# Patient Record
Sex: MALE | Race: WHITE | NOT HISPANIC OR LATINO | Employment: FULL TIME | URBAN - METROPOLITAN AREA
[De-identification: names, ages, dates, MRNs, and addresses within clinical notes are randomized per-mention and may not be internally consistent; named-entity substitution may affect disease eponyms.]

---

## 2017-04-13 ENCOUNTER — TELEPHONE (OUTPATIENT)
Dept: FAMILY MEDICINE | Facility: CLINIC | Age: 52
End: 2017-04-13

## 2017-04-13 ENCOUNTER — OFFICE VISIT (OUTPATIENT)
Dept: FAMILY MEDICINE | Facility: CLINIC | Age: 52
End: 2017-04-13
Payer: COMMERCIAL

## 2017-04-13 VITALS
DIASTOLIC BLOOD PRESSURE: 70 MMHG | TEMPERATURE: 96.6 F | WEIGHT: 209 LBS | BODY MASS INDEX: 33.59 KG/M2 | OXYGEN SATURATION: 96 % | HEART RATE: 84 BPM | SYSTOLIC BLOOD PRESSURE: 120 MMHG | RESPIRATION RATE: 22 BRPM | HEIGHT: 66 IN

## 2017-04-13 DIAGNOSIS — Z79.4 TYPE 2 DIABETES MELLITUS WITH DIABETIC POLYNEUROPATHY, WITH LONG-TERM CURRENT USE OF INSULIN (H): ICD-10-CM

## 2017-04-13 DIAGNOSIS — E11.42 TYPE 2 DIABETES MELLITUS WITH DIABETIC POLYNEUROPATHY, WITH LONG-TERM CURRENT USE OF INSULIN (H): ICD-10-CM

## 2017-04-13 PROCEDURE — 99214 OFFICE O/P EST MOD 30 MIN: CPT | Performed by: FAMILY MEDICINE

## 2017-04-13 PROCEDURE — 99207 C FOOT EXAM  NO CHARGE: CPT | Mod: 25 | Performed by: FAMILY MEDICINE

## 2017-04-13 NOTE — NURSING NOTE
"Chief Complaint   Patient presents with     Diabetes     Forms       Initial /70  Pulse 84  Temp 96.6  F (35.9  C) (Temporal)  Resp 22  Ht 5' 6.1\" (1.679 m)  Wt 209 lb (94.8 kg)  SpO2 96%  BMI 33.63 kg/m2 Estimated body mass index is 33.63 kg/(m^2) as calculated from the following:    Height as of this encounter: 5' 6.1\" (1.679 m).    Weight as of this encounter: 209 lb (94.8 kg).  Medication Reconciliation: complete    "

## 2017-04-13 NOTE — MR AVS SNAPSHOT
"              After Visit Summary   2017    Ralph Hagen    MRN: [id number]           Patient Information     Date Of Birth          1965        Visit Information        Provider Department      2017 4:20 PM Jose Cruz Isaac MD [facility]        Today's Diagnoses     Type 2 diabetes mellitus with diabetic polyneuropathy, with long-term current use of insulin (H)        Type 2 diabetes mellitus with diabetic polyneuropathy, unspecified long term insulin use status (H)           Follow-ups after your visit        Who to contact     If you have questions or need follow up information about today's clinic visit or your schedule please contact [facility] directly at [phone].  Normal or non-critical lab and imaging results will be communicated to you by MyChart, letter or phone within 4 business days after the clinic has received the results. If you do not hear from us within 7 days, please contact the clinic through [employer]ma[employer] or phone. If you have a critical or abnormal lab result, we will notify you by phone as soon as possible.  Submit refill requests through [employer] or call your pharmacy and they will forward the refill request to us. Please allow 3 business days for your refill to be completed.          Additional Information About Your Visit        MyChart Information     [employer] lets you send messages to your doctor, view your test results, renew your prescriptions, schedule appointments and more. To sign up, go to [email or web address] . Click on \"Log in\" on the left side of the screen, which will take you to the Welcome page. Then click on \"Sign up Now\" on the right side of the page.     You will be asked to enter the access code listed below, as well as some personal information. Please follow the directions to create your username and password.     Your access code is: [email or web address]  Expires: 2017  4:43 PM     Your access code will  in 90 " "days. If you need help or a new code, please call your [place] clinic or [phone].        Care EveryWhere ID     This is your Care EveryWhere ID. This could be used by other organizations to access your [place] medical records  [id number]        Your Vitals Were     Pulse Temperature Respirations Height Pulse Oximetry BMI (Body Mass Index)    84 96.6  F (35.9  C) (Temporal) 22 5' 6.1\" (1.679 m) 96% 33.63 kg/m2       Blood Pressure from Last 3 Encounters:   04/13/17 120/70   12/09/16 134/82   03/24/16 132/86    Weight from Last 3 Encounters:   04/13/17 209 lb (94.8 kg)   12/09/16 214 lb (97.1 kg)   04/20/16 222 lb 9.6 oz (101 kg)              Today, you had the following     No orders found for display       Primary Care Provider Office Phone # Fax #    Jose Cruz Isaac [phone]       [facility] [address] [place] [address]        Thank you!     Thank you for choosing [facility]  for your care. Our goal is always to provide you with excellent care. Hearing back from our patients is one way we can continue to improve our services. Please take a few minutes to complete the written survey that you may receive in the mail after your visit with us. Thank you!             Your Updated Medication List - Protect others around you: Learn how to safely use, store and throw away your medicines at www.disposemymeds.org.          This list is accurate as of: 4/13/17  4:43 PM.  Always use your most recent med list.                   Brand Name Dispense Instructions for use    ACE INHIBITOR CONTRAINDICATED AND/OR NOT INDICATED     0 each        blood glucose monitoring lancets     1 Box    Use to test blood sugar four to six times daily or as directed.       blood glucose monitoring test strip    ACCU-CHEK SMARTVIEW    100 strip    Use to test blood sugar four to six times daily or as directed.       glucagon 1 MG kit    GLUCAGON EMERGENCY    1 mg    Inject 1 " mg into the muscle once for 1 dose       insulin glargine U-300 300 UNIT/ML injection    TOUJEO SOLOSTAR    4.5 mL    Inject 50 Units Subcutaneous At Bedtime       insulin lispro 100 UNIT/ML injection    HumaLOG KWIKpen    15 mL    INJECT 4 UNITS PER CARB UNDER THE SKIN THREE TIMES A DAY BEFORE MEALS AS DIRECTED --MAX OF 45 UNITS/DAY       insulin pen needle 32G X 4 MM    BD FARHAD U/F    100 each    Use 4 needles daily or as directed.       vitamin D 2000 UNITS tablet     100 tablet    Take 2,000 Units by mouth daily Taking 2 a day

## 2017-04-13 NOTE — TELEPHONE ENCOUNTER
Bob Pham. Here is Ralph's information for getting samples from the manufacture for his Humalog and trujeo. KB/CMA

## 2017-04-13 NOTE — PROGRESS NOTES
SUBJECTIVE:                                                    Ralph Hagen is a 51 year old male who presents to clinic today for the following health issues:      Diabetes Follow-up    Patient is checking blood sugars: once daily.  Results are as follows:        Always High 250-275    Diabetic concerns:  blood sugar frequently over 200     Symptoms of hypoglycemia (low blood sugar): shaky, sweaty     Paresthesias (numbness or burning in feet) or sores: Yes patient reports that he had neuropathy for a few years     Date of last diabetic eye exam: Over a year ago       Amount of exercise or physical activity: 4-5 days/week for an average of 30-45 minutes    Problems taking medications regularly: No    Medication side effects: none    Diet: regular (no restrictions)          Problem list and histories reviewed & adjusted, as indicated.  Additional history: as documented        Reviewed and updated as needed this visit by clinical staff  Tobacco  Allergies  Meds       Reviewed and updated as needed this visit by Provider        SUBJECTIVE:  Ralph  is a 52 year old male who presents for:  TYPE 2 diabetes. He has not been in very good control. Cannot afford his medications. He tries to take insulin that he is picked up on the Internet. He has diabetic neuropathy. He cannot afford Lyrica. He is motivated to get his diabetes in better control but his insurance will not cover most of his medications. He has not had any incidence.    Past Medical History:   Diagnosis Date     Diabetes (H) 2007 or 2008     RAMÍREZ (obstructive sleep apnea) 2008    AHI 22.3 cpap     Past Surgical History:   Procedure Laterality Date     TONSILLECTOMY  1998     Social History   Substance Use Topics     Smoking status: Former Smoker     Packs/day: 1.00     Years: 20.00     Types: Cigarettes     Quit date: 3/1/2013     Smokeless tobacco: Never Used      Comment: current E Cig      Alcohol use 0.0 oz/week     0 Standard drinks or equivalent  "per week      Comment: very occ     Current Outpatient Prescriptions   Medication Sig Dispense Refill     insulin glargine U-300 (TOUJEO SOLOSTAR) 300 UNIT/ML injection Inject 50 Units Subcutaneous At Bedtime 4.5 mL 2     insulin lispro (HUMALOG KWIKPEN) 100 UNIT/ML injection INJECT 4 UNITS PER CARB UNDER THE SKIN THREE TIMES A DAY BEFORE MEALS AS DIRECTED --MAX OF 45 UNITS/DAY 15 mL 3     insulin pen needle (BD FARHAD U/F) 32G X 4 MM Use 4 needles daily or as directed. 100 each prn     Cholecalciferol (VITAMIN D) 2000 UNITS tablet Take 2,000 Units by mouth daily Taking 2 a day 100 tablet 3     glucagon (GLUCAGON EMERGENCY) 1 MG injection Inject 1 mg into the muscle once for 1 dose 1 mg 1     ACE INHIBITOR CONTRAINDICATED AND/OR NOT INDICATED  0 each 0     blood glucose (ACCU-CHEK SMARTVIEW) test strip Use to test blood sugar four to six times daily or as directed. 100 strip prn     blood glucose monitoring (ACCU-CHEK FASTCLIX) lancets Use to test blood sugar four to six times daily or as directed. 1 Box prn       REVIEW OF SYSTEMS:   5 point ROS negative except as noted above in HPI, including Gen., Resp, CV, GI &  system review.     OBJECTIVE:  Vitals: /70  Pulse 84  Temp 96.6  F (35.9  C) (Temporal)  Resp 22  Ht 5' 6.1\" (1.679 m)  Wt 209 lb (94.8 kg)  SpO2 96%  BMI 33.63 kg/m2  BMI= Body mass index is 33.63 kg/(m^2).  He appears well and in no distress. Eyes PERRLA. Throat clear. Neck supple. Lungs are clear to auscultation. Heart regular rhythm no murmur. Skin is clear. Extremities show no edema. Foot exam shows diminished sensation in the bottom of the feet but color no sores pulses are there. Hemoglobin A1c from 3 months ago was 11.7.    ASSESSMENT:  #1 type 2 diabetes with peripheral neuropathy and poor control.    PLAN:  Needs to get tighter control. We are trying to make arrangements for him to get insulin at more of a discount. Filled out forms for driving for him I feel he is safe driving. " Needs to closely watch his diet. We will see him back in 3 months time.        Jose Cruz Isaac MD  [facility]

## 2017-04-27 ENCOUNTER — TELEPHONE (OUTPATIENT)
Dept: EDUCATION SERVICES | Facility: CLINIC | Age: 52
End: 2017-04-27

## 2017-04-27 NOTE — TELEPHONE ENCOUNTER
Called and left message with patient to call me back regarding his insulin supply.     Vero Greenberg RDN, LD, CDE

## 2017-08-10 ENCOUNTER — TELEPHONE (OUTPATIENT)
Dept: FAMILY MEDICINE | Facility: CLINIC | Age: 52
End: 2017-08-10

## 2017-08-10 NOTE — TELEPHONE ENCOUNTER
Panel Management Review      Patient has the following on his problem list:     Diabetes    ASA: not on med list    Last A1C  Lab Results   Component Value Date    A1C 11.7 12/09/2016    A1C 10.5 03/24/2016    A1C 7.9 05/01/2015    A1C 8.1 01/09/2015    A1C 13.4 08/29/2014     A1C tested: FAILED    Last LDL:    Lab Results   Component Value Date    CHOL 222 12/09/2016     Lab Results   Component Value Date    HDL 45 12/09/2016     Lab Results   Component Value Date    LDL  12/09/2016     Cannot estimate LDL when triglyceride exceeds 400 mg/dL     12/09/2016     Lab Results   Component Value Date    TRIG 502 12/09/2016     Lab Results   Component Value Date    CHOLHDLRATIO 6.0 01/02/2014     Lab Results   Component Value Date    NHDL 177 12/09/2016       Is the patient on a Statin? NO             Is the patient on Aspirin? NO        Last three blood pressure readings:  BP Readings from Last 3 Encounters:   04/13/17 120/70   12/09/16 134/82   03/24/16 132/86       Date of last diabetes office visit: 4/13/17     Tobacco History:     History   Smoking Status     Former Smoker     Packs/day: 1.00     Years: 20.00     Types: Cigarettes     Quit date: 3/1/2013   Smokeless Tobacco     Never Used     Comment: current E Cig              Composite cancer screening  Chart review shows that this patient is due/due soon for the following Colonoscopy  Summary:    Patient is due/failing the following:   Microalbumin, TSH and A1C    Action needed:   Patient needs office visit for diabetes follow up. and Patient needs non-fasting lab only appointment    Type of outreach:    Phone, left message for patient to call back.     Questions for provider review:    None                                                                                                                                    Nicky Monet CMA       Chart routed to Care Team .

## 2017-08-10 NOTE — LETTER
[address] [address]  [phone]        Ralph Hagen  [address] [address] [place] [id number]      August 28, 2017      Dear Ralph,    I care about your health and have reviewed your health plan, including your medical conditions, medication list, and lab results and am making recommendations based on this review, to better manage your health.    You are in particular need of attention regarding:  -Diabetes    I am recommending that you:  -schedule a LAB ONLY APPOINTMENT to recheck your: A1c, Microablumin and TSH (thyroid test) tests within the next 1-4 weeks.  -schedule a FOLLOWUP OFFICE APPOINTMENT with Dr. Isaac.        If you've had the preventative screening completed at another facility or feel you're not due for this screening, please call our clinic at the number listed above or send us a My Chart message so we can update our records. We would like to thank you in advance for taking the time to take care of your health.  If you have any questions, please don t hesitate to contact our clinic.    Sincerely,       Your [facility]

## 2017-11-02 ENCOUNTER — TELEPHONE (OUTPATIENT)
Dept: FAMILY MEDICINE | Facility: CLINIC | Age: 52
End: 2017-11-02

## 2017-11-02 NOTE — LETTER
[address]   [address]  Tel. [phone] / Fax [phone]    November 2, 2017        Ralph Hagen  [address] [address] [place] [address]          Dear Ralph,    Our records show that you are due for a diabetic recheck. Please call [phone] to schedule an appointment for the next available opening. Thank you and we hope to see you soon.    Sincerely,        Jose Cruz Isaac M.D.

## 2017-11-02 NOTE — TELEPHONE ENCOUNTER
Panel Management Review      Patient has the following on his problem list:     Diabetes    ASA: Failed    Last A1C  Lab Results   Component Value Date    A1C 11.7 12/09/2016    A1C 10.5 03/24/2016    A1C 7.9 05/01/2015    A1C 8.1 01/09/2015    A1C 13.4 08/29/2014     A1C tested: FAILED    Last LDL:    Lab Results   Component Value Date    CHOL 222 12/09/2016     Lab Results   Component Value Date    HDL 45 12/09/2016     Lab Results   Component Value Date    LDL  12/09/2016     Cannot estimate LDL when triglyceride exceeds 400 mg/dL     12/09/2016     Lab Results   Component Value Date    TRIG 502 12/09/2016     Lab Results   Component Value Date    CHOLHDLRATIO 6.0 01/02/2014     Lab Results   Component Value Date    NHDL 177 12/09/2016       Is the patient on a Statin? NO             Is the patient on Aspirin? NO        Last three blood pressure readings:  BP Readings from Last 3 Encounters:   04/13/17 120/70   12/09/16 134/82   03/24/16 132/86       Date of last diabetes office visit: 04/13/2017     Tobacco History:     History   Smoking Status     Former Smoker     Packs/day: 1.00     Years: 20.00     Types: Cigarettes     Quit date: 3/1/2013   Smokeless Tobacco     Never Used     Comment: current E Cig              Composite cancer screening  Chart review shows that this patient is due/due soon for the following Diabetic check   Summary:    Patient is due/failing the following:   A1C and ASA    Action needed:   Patient needs office visit for Diabetic check.    Type of outreach:    Sent letter.    Questions for provider review:    None                                                                                                                                    ERS     Chart routed to NONE .

## 2018-02-19 ENCOUNTER — HOSPITAL ENCOUNTER (EMERGENCY)
Facility: CLINIC | Age: 53
Discharge: HOME | End: 2018-02-19
Attending: STUDENT IN AN ORGANIZED HEALTH CARE EDUCATION/TRAINING PROGRAM | Admitting: STUDENT IN AN ORGANIZED HEALTH CARE EDUCATION/TRAINING PROGRAM
Payer: COMMERCIAL

## 2018-02-19 VITALS
HEIGHT: 68 IN | OXYGEN SATURATION: 99 % | WEIGHT: 200 LBS | DIASTOLIC BLOOD PRESSURE: 87 MMHG | SYSTOLIC BLOOD PRESSURE: 141 MMHG | RESPIRATION RATE: 16 BRPM | TEMPERATURE: 98.1 F | BODY MASS INDEX: 30.31 KG/M2

## 2018-02-19 DIAGNOSIS — S13.4XXA WHIPLASH INJURY TO NECK, INITIAL ENCOUNTER: ICD-10-CM

## 2018-02-19 DIAGNOSIS — M54.6 ACUTE RIGHT-SIDED THORACIC BACK PAIN: ICD-10-CM

## 2018-02-19 DIAGNOSIS — M25.511 ACUTE PAIN OF RIGHT SHOULDER: ICD-10-CM

## 2018-02-19 PROCEDURE — 99284 EMERGENCY DEPT VISIT MOD MDM: CPT | Mod: Z6 | Performed by: STUDENT IN AN ORGANIZED HEALTH CARE EDUCATION/TRAINING PROGRAM

## 2018-02-19 PROCEDURE — 99282 EMERGENCY DEPT VISIT SF MDM: CPT | Performed by: STUDENT IN AN ORGANIZED HEALTH CARE EDUCATION/TRAINING PROGRAM

## 2018-02-19 RX ORDER — CYCLOBENZAPRINE HCL 10 MG
10 TABLET ORAL 3 TIMES DAILY PRN
Qty: 15 TABLET | Refills: 0 | Status: SHIPPED | OUTPATIENT
Start: 2018-02-19 | End: 2018-02-24
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
## 2018-02-19 NOTE — ED AVS SNAPSHOT
[facility]    [address] [facility] [address]    Phone:  [phone]    Fax:  [phone]                                       Ralph Hagen   MRN: [id number]    Department:  [facility]   Date of Visit:  2/19/2018           After Visit Summary Signature Page     I have received my discharge instructions, and my questions have been answered. I have discussed any challenges I see with this plan with the nurse or doctor.    ..........................................................................................................................................  Patient/Patient Representative Signature      ..........................................................................................................................................  Patient Representative Print Name and Relationship to Patient    ..................................................               ................................................  Date                                            Time    ..........................................................................................................................................  Reviewed by Signature/Title    ...................................................              ..............................................  Date                                                            Time
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
## 2018-03-30 ENCOUNTER — OFFICE VISIT (OUTPATIENT)
Dept: FAMILY MEDICINE | Facility: CLINIC | Age: 53
End: 2018-03-30
Payer: COMMERCIAL

## 2018-03-30 VITALS
BODY MASS INDEX: 31.84 KG/M2 | HEART RATE: 99 BPM | DIASTOLIC BLOOD PRESSURE: 70 MMHG | OXYGEN SATURATION: 99 % | TEMPERATURE: 98 F | SYSTOLIC BLOOD PRESSURE: 110 MMHG | WEIGHT: 210.1 LBS | HEIGHT: 68 IN

## 2018-03-30 DIAGNOSIS — E11.9 TYPE 2 DIABETES MELLITUS WITHOUT COMPLICATION, WITH LONG-TERM CURRENT USE OF INSULIN (H): Primary | ICD-10-CM

## 2018-03-30 DIAGNOSIS — Z79.4 TYPE 2 DIABETES MELLITUS WITHOUT COMPLICATION, WITH LONG-TERM CURRENT USE OF INSULIN (H): Primary | ICD-10-CM

## 2018-03-30 DIAGNOSIS — E55.9 VITAMIN D DEFICIENCY: ICD-10-CM

## 2018-03-30 DIAGNOSIS — E29.1 HYPOGONADISM MALE: ICD-10-CM

## 2018-03-30 LAB
CREAT SERPL-MCNC: 0.56 MG/DL (ref 0.66–1.25)
GFR SERPL CREATININE-BSD FRML MDRD: >90 ML/MIN/1.7M2

## 2018-03-30 PROCEDURE — 99214 OFFICE O/P EST MOD 30 MIN: CPT | Performed by: FAMILY MEDICINE

## 2018-03-30 PROCEDURE — 84403 ASSAY OF TOTAL TESTOSTERONE: CPT | Performed by: FAMILY MEDICINE

## 2018-03-30 PROCEDURE — 82306 VITAMIN D 25 HYDROXY: CPT | Performed by: FAMILY MEDICINE

## 2018-03-30 PROCEDURE — 36415 COLL VENOUS BLD VENIPUNCTURE: CPT | Performed by: FAMILY MEDICINE

## 2018-03-30 PROCEDURE — 82565 ASSAY OF CREATININE: CPT | Performed by: FAMILY MEDICINE

## 2018-03-30 PROCEDURE — 84270 ASSAY OF SEX HORMONE GLOBUL: CPT | Performed by: FAMILY MEDICINE

## 2018-03-30 NOTE — MR AVS SNAPSHOT
After Visit Summary   3/30/2018    Ralph Hagen    MRN: [id number]           Patient Information     Date Of Birth          1965        Visit Information        Provider Department      3/30/2018 9:40 AM Jose Cruz Isaac MD [facility]        Today's Diagnoses     Type 2 diabetes mellitus without complication, with long-term current use of insulin (H)    -  1    Hypogonadism male        Vitamin D deficiency           Follow-ups after your visit        Additional Services     MED THERAPY MANAGE REFERRAL       Your provider has referred you to: **[facility] Scheduling (numerous locations) [phone]   [email or web address]  [facility]: [facility] [phone]   [email or web address]    Reason for Referral: diabetes    The [facility] department will contact you to schedule an appointment.  You may also schedule the appointment by calling [phone].  For [facility] [place] [facility] [place] patients, please call [phone] to confirm/schedule your appointment on the next business day.    This service is designed to help you get the most from your medications.  A specially trained Pharmacist will work closely with you and your providers to solve any questions, concerns, issues or problems related to your medications.    Please bring all of your prescription and non-prescription medications (such as vitamins, over-the-counter medications, and herbals) or a detailed medication list to your appointment.    If you have a glucose meter or other home monitoring information, please also bring this to your appointment (i.e. blood glucose log, blood pressure log, pain log, etc.).                  Your next 10 appointments already scheduled     Apr 04, 2018  3:00 PM CDT   Office Visit with Bandar Colón, [facility]  "Clinic [facility] ([facility])    [address] [id number] [phone]           Bring a current list of meds and any records pertaining to this visit. For Physicals, please bring immunization records and any forms needing to be filled out. Please arrive 10 minutes early to complete paperwork.              Future tests that were ordered for you today     Open Future Orders        Priority Expected Expires Ordered    **TSH with free T4 reflex FUTURE 2mo Routine 5/29/2018 7/28/2018 3/30/2018    **A1C FUTURE 3mo Routine 6/28/2018 7/28/2018 3/30/2018            Who to contact     If you have questions or need follow up information about today's clinic visit or your schedule please contact [facility] directly at [phone].  Normal or non-critical lab and imaging results will be communicated to you by [employer], letter or phone within 4 business days after the clinic has received the results. If you do not hear from us within 7 days, please contact the clinic through [employer] or phone. If you have a critical or abnormal lab result, we will notify you by phone as soon as possible.  Submit refill requests through [employer] or call your pharmacy and they will forward the refill request to us. Please allow 3 business days for your refill to be completed.          Additional Information About Your Visit        [employer] Information     [employer] lets you send messages to your doctor, view your test results, renew your prescriptions, schedule appointments and more. To sign up, go to [email or web address] . Click on \"Log in\" on the left side of the screen, which will take you to the Welcome page. Then click on \"Sign up Now\" on the right side of the page.     You will be asked to enter the access code listed below, as well as some personal information. Please follow the directions to create your username and password.     Your access code is: [email or web address]  Expires: 5/20/2018 11:01 AM   " "  Your access code will  in 90 days. If you need help or a new code, please call your [facility] clinic or [phone].        Care EveryWhere ID     This is your Care EveryWhere ID. This could be used by other organizations to access your [facility] medical records  [id number]        Your Vitals Were     Pulse Temperature Height Pulse Oximetry BMI (Body Mass Index)       99 98  F (36.7  C) (Temporal) 5' 8\" (1.727 m) 99% 31.95 kg/m2        Blood Pressure from Last 3 Encounters:   18 110/70   18 141/87   17 120/70    Weight from Last 3 Encounters:   18 210 lb 1.6 oz (95.3 kg)   18 200 lb (90.7 kg)   17 209 lb (94.8 kg)              We Performed the Following     Creatinine     MED THERAPY MANAGE REFERRAL     TESTOSTERONE, FREE & TOTAL     Vitamin D Deficiency        Primary Care Provider Office Phone # Fax #    Jose Cruz Isaac [phone]       [address] [address]        Equal Access to Services     [facility] : Hadii kadi ku hadasho Soomaali, waaxda luqadaha, qaybta kaalmada adenegroyaaddison, sarika pinto . So [facility] [phone].    ATENCIÓN: Si habla español, tiene a haynes disposición servicios gratuitos de asistencia lingüística. [facility] [phone].    We comply with applicable federal civil rights laws and [place] laws. We do not discriminate on the basis of race, color, national origin, age, disability, sex, sexual orientation, or gender identity.            Thank you!     Thank you for choosing [facility]  for your care. Our goal is always to provide you with excellent care. Hearing back from our patients is one way we can continue to improve our services. Please take a few minutes to complete the written survey that you may receive in the mail after your visit with us. Thank you!             Your Updated Medication List - Protect others around you: Learn how to safely use, store and throw " away your medicines at www.disposemymeds.org.          This list is accurate as of 3/30/18 10:11 AM.  Always use your most recent med list.                   Brand Name Dispense Instructions for use Diagnosis    ACE INHIBITOR CONTRAINDICATED AND/OR NOT INDICATED     0 each         blood glucose monitoring lancets     1 Box    Use to test blood sugar four to six times daily or as directed.    Type I (juvenile type) diabetes mellitus without mention of complication, uncontrolled       blood glucose monitoring test strip    ACCU-CHEK SMARTVIEW    100 strip    Use to test blood sugar four to six times daily or as directed.    Type I (juvenile type) diabetes mellitus without mention of complication, uncontrolled       glucagon 1 MG kit    GLUCAGON EMERGENCY    1 mg    Inject 1 mg into the muscle once for 1 dose    Type 1 diabetes, HbA1c goal < 7% (H)       insulin glargine U-300 300 UNIT/ML injection    TOUJEO SOLOSTAR    4.5 mL    Inject 50 Units Subcutaneous At Bedtime    Type 2 diabetes mellitus with diabetic polyneuropathy, with long-term current use of insulin (H)       insulin lispro 100 UNIT/ML injection    HumaLOG KWIKpen    15 mL    INJECT 4 UNITS PER CARB UNDER THE SKIN THREE TIMES A DAY BEFORE MEALS AS DIRECTED --MAX OF 45 UNITS/DAY    Type 2 diabetes mellitus with diabetic polyneuropathy, with long-term current use of insulin (H)       insulin pen needle 32G X 4 MM    BD FARHAD U/F    100 each    Use 4 needles daily or as directed.    Type 2 diabetes mellitus with diabetic polyneuropathy, with long-term current use of insulin (H)       vitamin D 2000 UNITS tablet     100 tablet    Take 2,000 Units by mouth daily Taking 2 a day    Unspecified vitamin D deficiency

## 2018-03-30 NOTE — NURSING NOTE
"Chief Complaint   Patient presents with     Diabetes     follow up        Initial /70 (BP Location: Right arm, Patient Position: Chair, Cuff Size: Adult Large)  Pulse 99  Temp 98  F (36.7  C) (Temporal)  Ht 5' 8\" (1.727 m)  Wt 210 lb 1.6 oz (95.3 kg)  SpO2 99%  BMI 31.95 kg/m2 Estimated body mass index is 31.95 kg/(m^2) as calculated from the following:    Height as of this encounter: 5' 8\" (1.727 m).    Weight as of this encounter: 210 lb 1.6 oz (95.3 kg).  Medication Reconciliation: complete     "

## 2018-03-30 NOTE — LETTER
[address]   [address]  Tel. [phone]/ Fax [phone]    February 8, 2019        Ralph Hagen  [address] [address] [place] [address]          Dear Mr. Ralph Hagen:    Your previous orders for lab work have been extended.  Please call to schedule a lab appointment and return to the clinic to have the labs drawn at your earliest convenience.    If you are required to be fasting for these tests,  remember to have nothing by mouth (except water) after midnight on the night before your test.    If you have any questions or concerns, please contact our office.    Sincerely,       Jose Cruz Isaac M.D. Care team

## 2018-03-30 NOTE — PROGRESS NOTES
SUBJECTIVE:   Ralph Hagen is a 52 year old male who presents to clinic today for the following health issues:    Patient has no questions or concerns at this time. Patient is not fasting today.       Diabetes Follow-up      Patient is checking blood sugars: not at all    Diabetic concerns: None     Symptoms of hypoglycemia (low blood sugar): none     Paresthesias (numbness or burning in feet) or sores: Yes neuropathy has improved a bit.      Date of last diabetic eye exam: 2 yrs ago maybe     BP Readings from Last 2 Encounters:   02/19/18 141/87   04/13/17 120/70     Hemoglobin A1C (%)   Date Value   12/09/2016 11.7 (H)   03/24/2016 10.5 (H)     LDL Cholesterol Calculated (mg/dL)   Date Value   12/09/2016     Cannot estimate LDL when triglyceride exceeds 400 mg/dL   03/24/2016     Cannot estimate LDL when triglyceride exceeds 400 mg/dL     LDL Cholesterol Direct (mg/dL)   Date Value   12/09/2016 124 (H)       Amount of exercise or physical activity: 4-5 days/week for an average of 15-30 minutes    Problems taking medications regularly: No    Medication side effects: none    Diet: regular (no restrictions)            Problem list and histories reviewed & adjusted, as indicated.  Additional history: as documented        Reviewed and updated as needed this visit by clinical staff       Reviewed and updated as needed this visit by Provider        SUBJECTIVE:  Ralph  is a 52 year old male who presents for: Follow-up of his diabetes.  He has not been seen in a long time.  He is not checking his blood sugars at all.  Last hemoglobin A1c was very high.  11.7 in December 2016.  He is not fasting today.  Also he has noticed decreased libido.  He wants testosterone levels checked.  He is overdue for many other labs but because is not fasting we will have to wait.  He is on insulin.    Past Medical History:   Diagnosis Date     Diabetes (H) 2007 or 2008     RAMÍREZ (obstructive sleep apnea) 2008    AHI 22.3 cpap     Past  "Surgical History:   Procedure Laterality Date     TONSILLECTOMY  1998     Social History   Substance Use Topics     Smoking status: Former Smoker     Packs/day: 1.00     Years: 20.00     Types: Cigarettes     Quit date: 3/1/2013     Smokeless tobacco: Never Used      Comment: current E Cig      Alcohol use 0.0 oz/week     0 Standard drinks or equivalent per week      Comment: very occ     Current Outpatient Prescriptions   Medication Sig Dispense Refill     insulin glargine U-300 (TOUJEO SOLOSTAR) 300 UNIT/ML injection Inject 50 Units Subcutaneous At Bedtime 4.5 mL 2     insulin lispro (HUMALOG KWIKPEN) 100 UNIT/ML injection INJECT 4 UNITS PER CARB UNDER THE SKIN THREE TIMES A DAY BEFORE MEALS AS DIRECTED --MAX OF 45 UNITS/DAY 15 mL 3     insulin pen needle (BD FARHAD U/F) 32G X 4 MM Use 4 needles daily or as directed. 100 each prn     blood glucose (ACCU-CHEK SMARTVIEW) test strip Use to test blood sugar four to six times daily or as directed. 100 strip prn     blood glucose monitoring (ACCU-CHEK FASTCLIX) lancets Use to test blood sugar four to six times daily or as directed. 1 Box prn     Cholecalciferol (VITAMIN D) 2000 UNITS tablet Take 2,000 Units by mouth daily Taking 2 a day 100 tablet 3     glucagon (GLUCAGON EMERGENCY) 1 MG injection Inject 1 mg into the muscle once for 1 dose 1 mg 1     ACE INHIBITOR CONTRAINDICATED AND/OR NOT INDICATED  0 each 0       REVIEW OF SYSTEMS:   5 point ROS negative except as noted above in HPI, including Gen., Resp, CV, GI &  system review.     OBJECTIVE:  Vitals: /70 (BP Location: Right arm, Patient Position: Chair, Cuff Size: Adult Large)  Pulse 99  Temp 98  F (36.7  C) (Temporal)  Ht 5' 8\" (1.727 m)  Wt 210 lb 1.6 oz (95.3 kg)  SpO2 99%  BMI 31.95 kg/m2  BMI= Body mass index is 31.95 kg/(m^2).  He appears well and in no distress.  Eyes PERRLA.  Throat clear.  Neck supple.  Lungs are clear.  Heart regular rhythm no murmur.  Skin clear.  Extremities with no edema. "  Foot exam shows good pulses good sensation.    ASSESSMENT:  #1 diabetes #2 decreased libido #3 history of vitamin D deficiency    PLAN:  We will set him up with MTM.  We need to get a hemoglobin A1c on him.  Vitamin D TSH testosterone level.  Continue his medications he has been getting him from the Internet because of insulin costs.        Jose Cruz Isaac MD  [facility]

## 2018-04-02 LAB — DEPRECATED CALCIDIOL+CALCIFEROL SERPL-MC: 11 UG/L (ref 20–75)

## 2018-04-04 ENCOUNTER — OFFICE VISIT (OUTPATIENT)
Dept: PHARMACY | Facility: CLINIC | Age: 53
End: 2018-04-04
Payer: COMMERCIAL

## 2018-04-04 VITALS — DIASTOLIC BLOOD PRESSURE: 82 MMHG | SYSTOLIC BLOOD PRESSURE: 130 MMHG

## 2018-04-04 DIAGNOSIS — E55.9 VITAMIN D DEFICIENCY: ICD-10-CM

## 2018-04-04 DIAGNOSIS — E78.5 HYPERLIPIDEMIA LDL GOAL <100: ICD-10-CM

## 2018-04-04 DIAGNOSIS — E11.42 TYPE 2 DIABETES MELLITUS WITH DIABETIC POLYNEUROPATHY, WITH LONG-TERM CURRENT USE OF INSULIN (H): Primary | ICD-10-CM

## 2018-04-04 DIAGNOSIS — Z79.4 TYPE 2 DIABETES MELLITUS WITH DIABETIC POLYNEUROPATHY, WITH LONG-TERM CURRENT USE OF INSULIN (H): Primary | ICD-10-CM

## 2018-04-04 PROCEDURE — 99605 MTMS BY PHARM NP 15 MIN: CPT | Performed by: PHARMACIST

## 2018-04-04 PROCEDURE — 99607 MTMS BY PHARM ADDL 15 MIN: CPT | Performed by: PHARMACIST

## 2018-04-04 NOTE — PROGRESS NOTES
SUBJECTIVE/OBJECTIVE:                           Ralph Hagen is a 52 year old male coming in for an initial visit for Medication Therapy Management.  He was referred to me from Dr. Isaac.     Chief Complaint: Diabetes/Medication Costs.  Personal Healthcare Goals: get sugars under control, daughter is having first grandchild soon    Allergies/ADRs: Reviewed in Epic  Tobacco: History of tobacco dependence - quit 5 years ago - started E-cig recently  Alcohol: 4-6 beverages / week  Caffeine: a few cans of diet sodas/day  Activity: Runner  PMH: Reviewed in Epic    Medication Adherence/Access:  Patient takes medications directly from bottles.  Patient takes medications 4 time(s) per day.   Per patient, misses medication 0 times per week.   Medication barriers: obtaining medication from insurance and affording medications.   The patient fills medications at [facility]: YES.    Diabetes:  Pt currently taking Lantus/Toujeo 35-40 if Lantus (25-35 units with Toujeo) and Humalog/Novolog - 18-20 units (2-4 units per carb choice).  Pt had been buying his insulin off of [employer] due to high deductible plan. Pt states he purchases from a known individual who stores medication as directed.  He has purchased from other people in the past and found it did not work as well.  He has a large ~$6000 deductible, so has not been able to afford his own insulin.  Has previously used pens and vials/syringes.  Has large supply of insulin on hand at this time.  Lost insurance that covered pump supplies/sensors which he was doing very well on.  Came in for A1c the other day, but did not have this drawn for unknown reason (other labs were drawn).  Concerned about too much insulin as he fears going low overnight without someone there to help. Pt is not experiencing side effects.  SMBG: 3 times a week. He tries to ration test strips as these are also a higher cost for him.   Ranges (patient reported): 250-300 mg/dL  Patient is not experiencing  hypoglycemia  Recent symptoms of high blood sugar? polydipsia, polyphagia, fatigue and insomnia. Did have some concerns with decreased libido and this is why he was tested for low testosterone.   Eye exam: due  Foot exam: up to date  Microalbumin is < 30 mg/g. Pt is not taking an ACEi/ARB.  Aspirin: Not taking due to not being previously recommended  Diet/Exercise: Loves sweets - cookies/candies/banana.  Within last year he has lost 50 lbs.  Tries to eat three meals per day.  High stress job, recent divorce.  Tries to stay active - runner.    Vitamin D Deficiency: Currently taking no supplement. Vitamin D level from 3/30/18 was 11 ug/dL. Pt reports feeling tired, gets sick easily, low mood.     Hyperlipidemia: Current therapy includes no current medications. Had previously been on statin and tolerated. Has not been on for some time. Pt reports no significant myalgias or other side effects.   The 10-year ASCVD risk score (Red Housemarcie ALATORRE Jr, et al., 2013) is: 6.5%    Values used to calculate the score:      Age: 52 years      Sex: Male      Is Non- : No      Diabetic: Yes      Tobacco smoker: No      Systolic Blood Pressure: 110 mmHg      Is BP treated: No      HDL Cholesterol: 45 mg/dL      Total Cholesterol: 222 mg/dL    Current labs include:  Today's Vitals: /82  BP Readings from Last 3 Encounters:   03/30/18 110/70   02/19/18 141/87   04/13/17 120/70     Lab Results   Component Value Date    A1C 11.7 12/09/2016   .  Lab Results   Component Value Date    CHOL 222 12/09/2016     Lab Results   Component Value Date    TRIG 502 12/09/2016     Lab Results   Component Value Date    HDL 45 12/09/2016     Lab Results   Component Value Date    LDL  12/09/2016     Cannot estimate LDL when triglyceride exceeds 400 mg/dL     12/09/2016       Liver Function Studies -   Recent Labs   Lab Test  12/09/16   1005   PROTTOTAL  7.5   ALBUMIN  4.1   BILITOTAL  0.7   ALKPHOS  134   AST  15   ALT  68        Lab Results   Component Value Date    UCRR 160 05/01/2015    MICROL 7 05/01/2015    UMALCR 4.61 05/01/2015       Last Basic Metabolic Panel:  Lab Results   Component Value Date     12/09/2016      Lab Results   Component Value Date    POTASSIUM 4.0 12/09/2016     Lab Results   Component Value Date    CHLORIDE 102 12/09/2016     Lab Results   Component Value Date    BUN 16 12/09/2016     Lab Results   Component Value Date    CR 0.56 03/30/2018     GFR Estimate   Date Value Ref Range Status   03/30/2018 >90 >60 mL/min/1.7m2 Final     Comment:     Non  GFR Calc   12/09/2016 >90  Non  GFR Calc   >60 mL/min/1.7m2 Final   03/24/2016 >90  Non  GFR Calc   >60 mL/min/1.7m2 Final     TSH   Date Value Ref Range Status   08/29/2014 1.61 0.40 - 4.00 mU/L Final     Comment:     Effective 7/30/2014, the reference range for this assay has changed to reflect   new instrumentation/methodology.         There is no immunization history on file for this patient.    ASSESSMENT:                             Current medications were reviewed today.     Medication Adherence: fair, needs improvement - see below    Diabetes: Needs Improvement. Patient is not meeting A1c goal of < 7%. Self monitoring of blood glucose is not at goal of fasting  mg/dL and post prandial < 180 mg/dL.  Pt would benefit from obtaining his own supply through his pharmacy - need to determine costs of medication via co-pay assistance programs and what is covered by formulary.  Given that A1c was not drawn and patient has been limiting testing to ration costs, he would benefit from obtaining different testing system that would allow him to test as necessary to determine current control and improve dosing of mealtime insulin.  Also could consider metformin in the future to lower insulin needs/resistance.    Vitamin D Deficiency: Needs Improvement. Pt would benefit from restarting vitamin  D3.    Hyperlipidemia: Needs Improvement. Pt is not on moderate intensity statin which is indicated based on 2013 ACC/AHA guidelines for lipid management.  Given other priorities will defer starting statin until future visit.    PLAN:                            1. Start aspirin 81 mg daily.  2. Pt to start checking blood sugars three times daily.  Discussed Relion meters and Freestyle Kostas to save on testing costs.  3. Pt/MTM to determine which insulins are covered under current insurance formulary.  Also discussed option for Relion insulin products.  4. Future considerations - statin?    I spent 60 minutes with this patient today. All changes were made via collaborative practice agreement with Jose Cruz Isaac. A copy of the visit note was provided to the patient's primary care provider.    Will follow up in 2-4 weeks.    The patient was given a summary of these recommendations as an after visit summary.     Luis Miguel Colón, PharmD, [facility]  Medication Therapy Management Pharmacist  Pager: [phone]

## 2018-04-04 NOTE — PATIENT INSTRUCTIONS
Recommendations from today's MTM visit:                                                    MTM (medication therapy management) is a service provided by a clinical pharmacist designed to help you get the most of out of your medicines.   Today we reviewed what your medicines are for, how to know if they are working, that your medicines are safe and how to make your medicine regimen as easy as possible.     1. We will first have you start testing up to three times a day.  There are a few options available for you to save money on test strips.  The best option is the Relion Glucometer from [employer] and the test strips - around $10 for 50.  The FreeStyle Kostas continuous glucose monitor is another option that may be able to consider.  Take a look at the information we gave you and let us know if you would be interested in trying.     2. Regarding insulin we will look at sending in for what is covered under your insurance and seeing if Co-pay cards can bring down the price to a reasonable amount.  Another option in the future if needed is the Relion brand insulin from [employer].  These are older varieties of insulin, but are still effective.      3. Start vitamin D3 2000 units daily.     4. Start aspirin 81 mg daily.    5. We will talk about restarting a statin (a heart protection/cholesterol) in the future.     Next MTM visit: We will look at scheduling an appointment about a month from now, but will check in with Alejandra first.     To schedule another MTM appointment, please call the clinic directly or you may call the MTM scheduling line at [phone] or toll-free at [phone].     My Clinical Pharmacist's contact information:                                                      It was a pleasure seeing you today!  Please feel free to contact me with any questions or concerns you have.      Luis Miguel Colón, PharmD, BCACP  Medication Therapy Management Pharmacist  Pager: [phone]    You may receive a survey about the  MTM services you received.  I would appreciate your feedback to help me serve you better in the future. Please fill it out and return it when you can. Your comments will be anonymous.

## 2018-04-04 NOTE — MR AVS SNAPSHOT
After Visit Summary   4/4/2018    Ralph Hagen    MRN: [id number]           Patient Information     Date Of Birth          1965        Visit Information        Provider Department      4/4/2018 3:00 PM Bandar Colón, [facility] MTM        Care Instructions    Recommendations from today's MTM visit:                                                    MTM (medication therapy management) is a service provided by a clinical pharmacist designed to help you get the most of out of your medicines.   Today we reviewed what your medicines are for, how to know if they are working, that your medicines are safe and how to make your medicine regimen as easy as possible.     1. We will first have you start testing up to three times a day.  There are a few options available for you to save money on test strips.  The best option is the Relion Glucometer from [employer] and the test strips - around $10 for 50.  The FreeStyle Kostas continuous glucose monitor is another option that may be able to consider.  Take a look at the information we gave you and let us know if you would be interested in trying.     2. Regarding insulin we will look at sending in for what is covered under your insurance and seeing if Co-pay cards can bring down the price to a reasonable amount.  Another option in the future if needed is the Relion brand insulin from [employer].  These are older varieties of insulin, but are still effective.      3. Start vitamin D3 2000 units daily.     4. Start aspirin 81 mg daily.    5. We will talk about restarting a statin (a heart protection/cholesterol) in the future.     Next MTM visit: We will look at scheduling an appointment about a month from now, but will check in with Alejandra first.     To schedule another MTM appointment, please call the clinic directly or you may call the MTM scheduling line at [phone] or toll-free at [phone].     My Clinical Pharmacist's  contact information:                                                      It was a pleasure seeing you today!  Please feel free to contact me with any questions or concerns you have.      Luis Miguel Colón, PharmD, [facility]  Medication Therapy Management Pharmacist  Pager: [phone]    You may receive a survey about the [facility] services you received.  I would appreciate your feedback to help me serve you better in the future. Please fill it out and return it when you can. Your comments will be anonymous.                Follow-ups after your visit        Who to contact     If you have questions or need follow up information about today's clinic visit or your schedule please contact [facility] directly at [phone].  Normal or non-critical lab and imaging results will be communicated to you by MyChart, letter or phone within 4 business days after the clinic has received the results. If you do not hear from us within 7 days, please contact the clinic through MobileRQhart or phone. If you have a critical or abnormal lab result, we will notify you by phone as soon as possible.  Submit refill requests through Send the Trend or call your pharmacy and they will forward the refill request to us. Please allow 3 business days for your refill to be completed.          Additional Information About Your Visit        MyChart Information     Send the Trend gives you secure access to your electronic health record. If you see a primary care provider, you can also send messages to your care team and make appointments. If you have questions, please call your primary care clinic.  If you do not have a primary care provider, please call [phone] and they will assist you.        Care EveryWhere ID     This is your Care EveryWhere ID. This could be used by other organizations to access your [facility] medical records  [id number]         Blood Pressure from Last 3 Encounters:   03/30/18 110/70   02/19/18 141/87   04/13/17 120/70    Weight from  Last 3 Encounters:   03/30/18 210 lb 1.6 oz (95.3 kg)   02/19/18 200 lb (90.7 kg)   04/13/17 209 lb (94.8 kg)              Today, you had the following     No orders found for display       Primary Care Provider Office Phone # Fax #    Jose Cruz Isaac [phone]       [address] [facility] [address]        Equal Access to Services     FRANKI FOUNTAIN : Hadii aad ku hadasho Soomaali, waaxda luqadaha, qaybta kaalmada adeegyada, waxay idiin hayaan adeeg kharash la'betzyn . So [facility] [phone].    ATENCIÓN: Si habla español, tiene a haynes disposición servicios gratuitos de asistencia lingüística. Llame al [phone].    We comply with applicable federal civil rights laws and [place] laws. We do not discriminate on the basis of race, color, national origin, age, disability, sex, sexual orientation, or gender identity.            Thank you!     Thank you for choosing [facility]  for your care. Our goal is always to provide you with excellent care. Hearing back from our patients is one way we can continue to improve our services. Please take a few minutes to complete the written survey that you may receive in the mail after your visit with us. Thank you!             Your Updated Medication List - Protect others around you: Learn how to safely use, store and throw away your medicines at www.disposemymeds.org.          This list is accurate as of 4/4/18  3:53 PM.  Always use your most recent med list.                   Brand Name Dispense Instructions for use Diagnosis    ACE INHIBITOR CONTRAINDICATED AND/OR NOT INDICATED     0 each         blood glucose monitoring lancets     1 Box    Use to test blood sugar four to six times daily or as directed.    Type I (juvenile type) diabetes mellitus without mention of complication, uncontrolled       blood glucose monitoring test strip    ACCU-CHEK SMARTVIEW    100 strip    Use to test blood sugar four to six times daily or as  directed.    Type I (juvenile type) diabetes mellitus without mention of complication, uncontrolled       glucagon 1 MG kit    GLUCAGON EMERGENCY    1 mg    Inject 1 mg into the muscle once for 1 dose    Type 1 diabetes, HbA1c goal < 7% (H)       insulin glargine U-300 300 UNIT/ML injection    TOUJEO SOLOSTAR    4.5 mL    Inject 50 Units Subcutaneous At Bedtime    Type 2 diabetes mellitus with diabetic polyneuropathy, with long-term current use of insulin (H)       insulin lispro 100 UNIT/ML injection    HumaLOG KWIKpen    15 mL    INJECT 4 UNITS PER CARB UNDER THE SKIN THREE TIMES A DAY BEFORE MEALS AS DIRECTED --MAX OF 45 UNITS/DAY    Type 2 diabetes mellitus with diabetic polyneuropathy, with long-term current use of insulin (H)       insulin pen needle 32G X 4 MM    BD FARHAD U/F    100 each    Use 4 needles daily or as directed.    Type 2 diabetes mellitus with diabetic polyneuropathy, with long-term current use of insulin (H)       vitamin D 2000 UNITS tablet     100 tablet    Take 2,000 Units by mouth daily Taking 2 a day    Unspecified vitamin D deficiency

## 2018-04-05 LAB
SHBG SERPL-SCNC: 27 NMOL/L (ref 11–80)
TESTOST FREE SERPL-MCNC: 8.71 NG/DL (ref 4.7–24.4)
TESTOST SERPL-MCNC: 385 NG/DL (ref 240–950)

## 2018-04-10 NOTE — PROGRESS NOTES
Labs show testosterone level was normal.  His vitamin D level was low with all and if not should be on some vitamin D supplement again.

## 2018-04-26 ENCOUNTER — TELEPHONE (OUTPATIENT)
Dept: FAMILY MEDICINE | Facility: CLINIC | Age: 53
End: 2018-04-26

## 2018-04-26 NOTE — LETTER
[address] [id number] [phone]        Ralph Hagen  [address] [address] [place] [address]      April 26, 2018      Dear Ralph,    I care about your health and have reviewed your health plan, including your medical conditions, medication list, and lab results and am making recommendations based on this review, to better manage your health.    You are in particular need of attention regarding:  -Colon Cancer Screening    I am recommending that you:  -schedule a COLONOSCOPY.  Colon cancer is now the second leading cause of cancer-related deaths in the [place] for both men and women.  There are over 130,000 new cases and 50,000 deaths per year from colon cancer.  A recent study, which included patients ages 55 to 79 found 50,400 [place] deaths from colorectal cancer could have been prevented if patients had undergone a colonoscopy in the previous 10 years.    If you have not had a colonoscopy, we encourage you to schedule by contacting us at [phone], Monday through Friday.  After hours, you may leave a message and we will return your call during normal business hours.      There is another option called a FIT test, if you don t wish to have a colonoscopy, which needs to be repeated every year.  It does replace the colonoscopy for colorectal cancer screening and can detect hidden bleeding in the lower colon.  If a positive result is obtained, you would be referred for a colonoscopy. Please discuss this option with your provider.      For patients under/uninsured, we recommend you contact the Vends[employer] program. [employer] is a free colorectal cancer screening program that provides colonoscopies for eligible under/uninsured [place] men and women. If you are interested in receiving a free colonoscopy, please call This Week In at [phone] (mention code ScopesWeb) to see if you re eligible.     If you've had the preventative  screening completed at another facility or feel you're not due for this screening, please call our clinic at the number listed above or send us a My Chart message so we can update our records. We would like to thank you in advance for taking the time to take care of your health.  If you have any questions, please don t hesitate to contact our clinic.    Sincerely,       Your [facility] Team

## 2018-04-26 NOTE — TELEPHONE ENCOUNTER
Panel Management Review      Patient has the following on his problem list:     Diabetes    ASA: Passed    Last A1C  Lab Results   Component Value Date    A1C 11.7 12/09/2016    A1C 10.5 03/24/2016    A1C 7.9 05/01/2015    A1C 8.1 01/09/2015    A1C 13.4 08/29/2014     A1C tested: FAILED    Last LDL:    Lab Results   Component Value Date    CHOL 222 12/09/2016     Lab Results   Component Value Date    HDL 45 12/09/2016     Lab Results   Component Value Date    LDL  12/09/2016     Cannot estimate LDL when triglyceride exceeds 400 mg/dL     12/09/2016     Lab Results   Component Value Date    TRIG 502 12/09/2016     Lab Results   Component Value Date    CHOLHDLRATIO 6.0 01/02/2014     Lab Results   Component Value Date    NHDL 177 12/09/2016       Is the patient on a Statin? NO             Is the patient on Aspirin? YES    Medications     Salicylates    aspirin 81 MG tablet          Last three blood pressure readings:  BP Readings from Last 3 Encounters:   04/04/18 130/82   03/30/18 110/70   02/19/18 141/87       Date of last diabetes office visit:03/30/2018     Tobacco History:     History   Smoking Status     Former Smoker     Packs/day: 1.00     Years: 20.00     Types: Cigarettes     Quit date: 3/1/2013   Smokeless Tobacco     Never Used     Comment: current E Cig            Composite cancer screening  Chart review shows that this patient is due/due soon for the following Colonoscopy and Fecal Colorectal (FIT)  Summary:    Patient is due/failing the following:   A1C, COLONOSCOPY and FIT    Action needed:   Patient needs non-fasting lab only appointment and Patient needs referral/order: Colonoscopy/FIT    Type of outreach:    Spoke to patient about A1c needing to get done. Sent letter for colonoscopy.     Questions for provider review:    None                                                                                                                                    ers     Chart routed to none  .

## 2018-11-29 ENCOUNTER — TELEPHONE (OUTPATIENT)
Dept: FAMILY MEDICINE | Facility: CLINIC | Age: 53
End: 2018-11-29

## 2018-11-29 DIAGNOSIS — E11.9 TYPE 2 DIABETES MELLITUS WITHOUT COMPLICATION, WITH LONG-TERM CURRENT USE OF INSULIN (H): Primary | ICD-10-CM

## 2018-11-29 DIAGNOSIS — Z79.4 TYPE 2 DIABETES MELLITUS WITHOUT COMPLICATION, WITH LONG-TERM CURRENT USE OF INSULIN (H): Primary | ICD-10-CM

## 2018-11-29 NOTE — TELEPHONE ENCOUNTER
Panel Management Review      Patient has the following on his problem list:     Diabetes    ASA: Not Required     Last A1C  Lab Results   Component Value Date    A1C 11.7 12/09/2016    A1C 10.5 03/24/2016    A1C 7.9 05/01/2015    A1C 8.1 01/09/2015    A1C 13.4 08/29/2014     A1C tested: FAILED    Last LDL:    Lab Results   Component Value Date    CHOL 222 12/09/2016     Lab Results   Component Value Date    HDL 45 12/09/2016     Lab Results   Component Value Date    LDL  12/09/2016     Cannot estimate LDL when triglyceride exceeds 400 mg/dL     12/09/2016     Lab Results   Component Value Date    TRIG 502 12/09/2016     Lab Results   Component Value Date    CHOLHDLRATIO 6.0 01/02/2014     Lab Results   Component Value Date    NHDL 177 12/09/2016       Is the patient on a Statin? NO             Is the patient on Aspirin? YES    Medications     Salicylates    aspirin 81 MG tablet          Last three blood pressure readings:  BP Readings from Last 3 Encounters:   04/04/18 130/82   03/30/18 110/70   02/19/18 141/87       Date of last diabetes office visit: 3/30/18     Tobacco History:     History   Smoking Status     Former Smoker     Packs/day: 1.00     Years: 20.00     Types: Cigarettes     Quit date: 3/1/2013   Smokeless Tobacco     Never Used     Comment: current E Cig            Composite cancer screening  Chart review shows that this patient is due/due soon for the following Colonoscopy  Summary:    Patient is due/failing the following:   A1C, COLONOSCOPY and LDL    Action needed:   Patient needs office visit for labs and physical .    Type of outreach:    Phone, spoke to patient.  A1C order placed for future patient will call to do lab only appointment in a couple weeks    Questions for provider review:    None                                                                                                                                    Melissa Mckoy MA     Chart routed to Care Team .

## 2019-07-12 NOTE — PROGRESS NOTES
Subjective     Ralph Hagen is a 54 year old male who presents to clinic today for the following health issues:    HPI   Diabetes Follow-up      How often are you checking your blood sugar? A few times a week    What time of day are you checking your blood sugars (select all that apply)?  After meals    Have you had any blood sugars above 200?  Yes     Have you had any blood sugars below 70?  No    What symptoms do you notice when your blood sugar is low?  None    What concerns do you have today about your diabetes? None     Do you have any of these symptoms? (Select all that apply)  No numbness or tingling in feet.  No redness, sores or blisters on feet.  No complaints of excessive thirst.  No reports of blurry vision.  No significant changes to weight.     Have you had a diabetic eye exam in the last 12 months? Yes- Date of last eye exam: november 2018    BP Readings from Last 2 Encounters:   07/15/19 112/76   04/04/18 130/82     Hemoglobin A1C (%)   Date Value   12/09/2016 11.7 (H)   03/24/2016 10.5 (H)     LDL Cholesterol Calculated (mg/dL)   Date Value   12/09/2016     Cannot estimate LDL when triglyceride exceeds 400 mg/dL   03/24/2016     Cannot estimate LDL when triglyceride exceeds 400 mg/dL     LDL Cholesterol Direct (mg/dL)   Date Value   12/09/2016 124 (H)       Diabetes Management Resources    Amount of exercise or physical activity: 2-3 days/week for an average of 15-30 minutes    Problems taking medications regularly: No    Medication side effects: none    Diet: diabetic    Patient presents today for diabetes follow-up. Has not been seen in over 1 year. He overall does not have good control of his diabetes. Las A1c we have on file was in 2016 and this was 11.7. Patient reports sugars are high when checked at home. Usually between 200-300. Uses insulin but buys this off [employer] due to cost issues. He reports he is good about taking this. Uses a sliding scale based on carbs with Humalog. Had a  pump about 6 years ago and sugars were very well controlled with this. Would really love to get back to this. Taking Aspirin. Not on a statin - unsure why. Overall feels well. Walks the dogs routinely but no other form of exercise. Does not really watch diet. Did have an eye exam last November.     Patient Active Problem List   Diagnosis     Obstructive sleep apnea     Hyperlipidemia LDL goal <100     Neuropathy     Morbid obesity (H)     Type 2 diabetes mellitus with diabetic polyneuropathy, with long-term current use of insulin (H)     Past Surgical History:   Procedure Laterality Date     TONSILLECTOMY         Social History     Tobacco Use     Smoking status: Former Smoker     Packs/day: 1.00     Years: 20.00     Pack years: 20.00     Types: Cigarettes     Last attempt to quit: 3/1/2013     Years since quittin.3     Smokeless tobacco: Never Used     Tobacco comment: current E Cig    Substance Use Topics     Alcohol use: Yes     Alcohol/week: 0.0 oz     Comment: very occ     Family History   Problem Relation Age of Onset     Cerebrovascular Disease No family hx of      Hypertension Mother      Hypertension Father      Diabetes No family hx of      C.A.D. No family hx of          Current Outpatient Medications   Medication Sig Dispense Refill     ACE INHIBITOR CONTRAINDICATED AND/OR NOT INDICATED  0 each 0     aspirin 81 MG tablet Take 81 mg by mouth daily       blood glucose (ACCU-CHEK SMARTVIEW) test strip Use to test blood sugar four to six times daily or as directed. 100 strip prn     blood glucose monitoring (ACCU-CHEK FASTCLIX) lancets Use to test blood sugar four to six times daily or as directed. 1 Box prn     Cholecalciferol (VITAMIN D) 2000 UNITS tablet Take 2,000 Units by mouth daily Taking 2 a day 100 tablet 3     insulin glargine (LANTUS VIAL) 100 UNIT/ML vial Inject 45 Units Subcutaneous At Bedtime       insulin lispro (HUMALOG KWIKPEN) 100 UNIT/ML injection INJECT 4 UNITS PER CARB UNDER THE  "SKIN THREE TIMES A DAY BEFORE MEALS AS DIRECTED --MAX OF 45 UNITS/DAY 15 mL 3     insulin pen needle (BD FARHAD U/F) 32G X 4 MM Use 4 needles daily or as directed. 100 each prn     No Known Allergies  BP Readings from Last 3 Encounters:   07/15/19 112/76   04/04/18 130/82   03/30/18 110/70    Wt Readings from Last 3 Encounters:   07/15/19 99.8 kg (220 lb)   03/30/18 95.3 kg (210 lb 1.6 oz)   02/19/18 90.7 kg (200 lb)         Reviewed and updated as needed this visit by Provider  Allergies  Meds  Problems  Med Hx  Surg Hx         Review of Systems   ROS COMP: Constitutional, HEENT, cardiovascular, pulmonary, GI, , musculoskeletal, neuro, skin, endocrine and psych systems are negative, except as otherwise noted.      Objective    /76   Pulse 80   Temp 97.6  F (36.4  C) (Temporal)   Resp 16   Ht 1.727 m (5' 8\")   Wt 99.8 kg (220 lb)   SpO2 97%   BMI 33.45 kg/m    Body mass index is 33.45 kg/m .  Physical Exam   GENERAL: healthy, alert and no distress  EYES: Eyes grossly normal to inspection, PERRL and conjunctivae and sclerae normal  HENT: ear canals and TM's normal, nose and mouth without ulcers or lesions  NECK: no adenopathy, no asymmetry, masses, or scars and thyroid normal to palpation  RESP: lungs clear to auscultation - no rales, rhonchi or wheezes  CV: regular rate and rhythm, normal S1 S2, no S3 or S4, no murmur, click or rub, no peripheral edema and peripheral pulses strong  ABDOMEN: soft, nontender, no hepatosplenomegaly, no masses and bowel sounds normal  SKIN: no suspicious lesions or rashes  NEURO: Normal strength and tone, mentation intact and speech normal  PSYCH: mentation appears normal, affect normal/bright  Diabetic foot exam: normal DP and PT pulses, no trophic changes or ulcerative lesions and normal sensory exam    Diagnostic Test Results:  Labs reviewed in Epic  No results found for this or any previous visit (from the past 24 hour(s)).        Assessment & Plan     1. Type 2 " diabetes mellitus with diabetic polyneuropathy, with long-term current use of insulin (H)  Labs ordered as below today. Patient currently purchasing insulin on the Internet due to cost. He did have a pump in the past and did very well with this. Discussed with patient that I would really favor him seeing diabetes education and having insulin use further evaluated and consider pump again. Encouraged ongoing diet and exercise modifications. He will continue daily aspirin use. Patient would like to check lipids before starting a statin.   - Albumin Random Urine Quantitative with Creat Ratio  - TSH WITH FREE T4 REFLEX  - HEMOGLOBIN A1C  - Lipid panel reflex to direct LDL Fasting  - insulin glargine (LANTUS VIAL) 100 UNIT/ML vial; Inject 45 Units Subcutaneous At Bedtime  - Comprehensive metabolic panel (BMP + Alb, Alk Phos, ALT, AST, Total. Bili, TP)  - Eye Exam - HIM Scan  - Eye Exam - HIM Scan  - DIABETES EDUCATOR REFERRAL  - FOOT EXAM    2. Screening for hyperlipidemia  Will check lipids and then further address need for statin as patient declined at this time.   - Lipid panel reflex to direct LDL Fasting    3. Special screening for malignant neoplasms, colon  Patient agreeable to FIT.   - Fecal colorectal cancer screen (FIT); Future    4. Class 1 obesity with serious comorbidity and body mass index (BMI) of 33.0 to 33.9 in adult, unspecified obesity type  Encouraged ongoing diet and exercise modifications.      The patient indicates understanding of these issues and agrees with the plan.    Shell Grande PA-C  [facility]

## 2019-07-15 ENCOUNTER — OFFICE VISIT (OUTPATIENT)
Dept: FAMILY MEDICINE | Facility: OTHER | Age: 54
End: 2019-07-15
Payer: COMMERCIAL

## 2019-07-15 VITALS
HEIGHT: 68 IN | WEIGHT: 220 LBS | OXYGEN SATURATION: 97 % | TEMPERATURE: 97.6 F | SYSTOLIC BLOOD PRESSURE: 112 MMHG | HEART RATE: 80 BPM | BODY MASS INDEX: 33.34 KG/M2 | RESPIRATION RATE: 16 BRPM | DIASTOLIC BLOOD PRESSURE: 76 MMHG

## 2019-07-15 DIAGNOSIS — E11.42 TYPE 2 DIABETES MELLITUS WITH DIABETIC POLYNEUROPATHY, WITH LONG-TERM CURRENT USE OF INSULIN (H): Primary | ICD-10-CM

## 2019-07-15 DIAGNOSIS — Z12.11 SPECIAL SCREENING FOR MALIGNANT NEOPLASMS, COLON: ICD-10-CM

## 2019-07-15 DIAGNOSIS — E66.811 CLASS 1 OBESITY WITH SERIOUS COMORBIDITY AND BODY MASS INDEX (BMI) OF 33.0 TO 33.9 IN ADULT, UNSPECIFIED OBESITY TYPE: ICD-10-CM

## 2019-07-15 DIAGNOSIS — Z13.220 SCREENING FOR HYPERLIPIDEMIA: ICD-10-CM

## 2019-07-15 DIAGNOSIS — Z79.4 TYPE 2 DIABETES MELLITUS WITH DIABETIC POLYNEUROPATHY, WITH LONG-TERM CURRENT USE OF INSULIN (H): Primary | ICD-10-CM

## 2019-07-15 LAB
CREAT UR-MCNC: 149 MG/DL
HBA1C MFR BLD: 10.8 % (ref 0–5.6)
LDLC SERPL DIRECT ASSAY-MCNC: 103 MG/DL
MICROALBUMIN UR-MCNC: 12 MG/L
MICROALBUMIN/CREAT UR: 7.99 MG/G CR (ref 0–17)
TSH SERPL DL<=0.005 MIU/L-ACNC: 0.45 MU/L (ref 0.4–4)

## 2019-07-15 PROCEDURE — 84443 ASSAY THYROID STIM HORMONE: CPT | Performed by: PHYSICIAN ASSISTANT

## 2019-07-15 PROCEDURE — 80061 LIPID PANEL: CPT | Performed by: PHYSICIAN ASSISTANT

## 2019-07-15 PROCEDURE — 80053 COMPREHEN METABOLIC PANEL: CPT | Performed by: PHYSICIAN ASSISTANT

## 2019-07-15 PROCEDURE — 82043 UR ALBUMIN QUANTITATIVE: CPT | Performed by: PHYSICIAN ASSISTANT

## 2019-07-15 PROCEDURE — 99207 C FOOT EXAM  NO CHARGE: CPT | Performed by: PHYSICIAN ASSISTANT

## 2019-07-15 PROCEDURE — 83036 HEMOGLOBIN GLYCOSYLATED A1C: CPT | Performed by: PHYSICIAN ASSISTANT

## 2019-07-15 PROCEDURE — 99214 OFFICE O/P EST MOD 30 MIN: CPT | Performed by: PHYSICIAN ASSISTANT

## 2019-07-15 PROCEDURE — 36415 COLL VENOUS BLD VENIPUNCTURE: CPT | Performed by: PHYSICIAN ASSISTANT

## 2019-07-15 PROCEDURE — 83721 ASSAY OF BLOOD LIPOPROTEIN: CPT | Mod: 59 | Performed by: PHYSICIAN ASSISTANT

## 2019-07-15 RX ORDER — INSULIN GLARGINE 100 [IU]/ML
45 INJECTION, SOLUTION SUBCUTANEOUS AT BEDTIME
COMMUNITY

## 2019-07-15 ASSESSMENT — MIFFLIN-ST. JEOR: SCORE: 1822.41

## 2019-07-16 ENCOUNTER — TELEPHONE (OUTPATIENT)
Dept: FAMILY MEDICINE | Facility: OTHER | Age: 54
End: 2019-07-16

## 2019-07-16 LAB
ALBUMIN SERPL-MCNC: 3.9 G/DL (ref 3.4–5)
ALP SERPL-CCNC: 128 U/L (ref 40–150)
ALT SERPL W P-5'-P-CCNC: 96 U/L (ref 0–70)
ANION GAP SERPL CALCULATED.3IONS-SCNC: 3 MMOL/L (ref 3–14)
AST SERPL W P-5'-P-CCNC: 31 U/L (ref 0–45)
BILIRUB SERPL-MCNC: 0.5 MG/DL (ref 0.2–1.3)
BUN SERPL-MCNC: 11 MG/DL (ref 7–30)
CALCIUM SERPL-MCNC: 9 MG/DL (ref 8.5–10.1)
CHLORIDE SERPL-SCNC: 108 MMOL/L (ref 94–109)
CHOLEST SERPL-MCNC: 224 MG/DL
CO2 SERPL-SCNC: 25 MMOL/L (ref 20–32)
CREAT SERPL-MCNC: 0.66 MG/DL (ref 0.66–1.25)
GFR SERPL CREATININE-BSD FRML MDRD: >90 ML/MIN/{1.73_M2}
GLUCOSE SERPL-MCNC: 316 MG/DL (ref 70–99)
HDLC SERPL-MCNC: 38 MG/DL
LDLC SERPL CALC-MCNC: ABNORMAL MG/DL
NONHDLC SERPL-MCNC: 186 MG/DL
POTASSIUM SERPL-SCNC: 4.4 MMOL/L (ref 3.4–5.3)
PROT SERPL-MCNC: 7.3 G/DL (ref 6.8–8.8)
SODIUM SERPL-SCNC: 136 MMOL/L (ref 133–144)
TRIGL SERPL-MCNC: 589 MG/DL

## 2019-07-16 NOTE — RESULT ENCOUNTER NOTE
Please notify patient that his A1c was still quite high at 10.8. His cholesterol levels were also very high. I would like him to start a statin to improve these numbers and decrease heart attack and stroke risk. If patient is agreeable to this I will send medication to pharmacy. Please notify patient that I would not anticipate this medication to be expensive.     Shell Grande PA-C

## 2019-07-16 NOTE — LETTER
July 18, 2019      Ralph Hagen  [address] [address] [address]        Dear ,    We are writing to inform you of your test results.   A1c was still quite high at 10.8. Your cholesterol levels were also very high. I would like you to start a statin to improve these numbers and decrease heart attack and stroke risk. If you are agreeable to this I will send medication to pharmacy. I would not anticipate this medication to be expensive.         Resulted Orders   Albumin Random Urine Quantitative with Creat Ratio   Result Value Ref Range    Creatinine Urine 149 mg/dL    Albumin Urine mg/L 12 mg/L    Albumin Urine mg/g Cr 7.99 0 - 17 mg/g Cr   TSH WITH FREE T4 REFLEX   Result Value Ref Range    TSH 0.45 0.40 - 4.00 mU/L   HEMOGLOBIN A1C   Result Value Ref Range    Hemoglobin A1C 10.8 (H) 0 - 5.6 %      Comment:      Normal <5.7% Prediabetes 5.7-6.4%  Diabetes 6.5% or higher - adopted from ADA   consensus guidelines.     Lipid panel reflex to direct LDL Fasting   Result Value Ref Range    Cholesterol 224 (H) <200 mg/dL      Comment:      Desirable:       <200 mg/dl    Triglycerides 589 (H) <150 mg/dL      Comment:      Borderline high:  150-199 mg/dl  High:             200-499 mg/dl  Very high:       >499 mg/dl  Fasting specimen      HDL Cholesterol 38 (L) >39 mg/dL    LDL Cholesterol Calculated  <100 mg/dL     Cannot estimate LDL when triglyceride exceeds 400 mg/dL      Comment:      Desirable:       <100 mg/dl  CORRECTED ON 07/16 AT 2025: PREVIOUSLY REPORTED AS Cannot estimate LDL when   triglyceride exceeds 400 mg/dL      Non HDL Cholesterol 186 (H) <130 mg/dL      Comment:      Above Desirable:  130-159 mg/dl  Borderline high:  160-189 mg/dl  High:             190-219 mg/dl  Very high:       >219 mg/dl     Comprehensive metabolic panel (BMP + Alb, Alk Phos, ALT, AST, Total. Bili, TP)   Result Value Ref Range    Sodium 136 133 - 144 mmol/L      Comment:      Test repeated and result corrected after  analyzer fixed  CORRECTED ON 07/16 AT 2025: PREVIOUSLY REPORTED       Potassium 4.4 3.4 - 5.3 mmol/L    Chloride 108 94 - 109 mmol/L    Carbon Dioxide 25 20 - 32 mmol/L    Anion Gap 3 3 - 14 mmol/L      Comment:      CORRECTED ON 07/16 AT 2025: PREVIOUSLY REPORTED AS 12    Glucose 316 (H) 70 - 99 mg/dL      Comment:      Fasting specimen    Urea Nitrogen 11 7 - 30 mg/dL    Creatinine 0.66 0.66 - 1.25 mg/dL    GFR Estimate >90 >60 mL/min/[1.73_m2]      Comment:      Starting 12/18/2018, serum creatinine based estimated GFR (eGFR) will be   calculated using the Chronic Kidney Disease Epidemiology Collaboration   (CKD-EPI) equation.  CORRECTED ON 07/16 AT 2025: PREVIOUSLY REPORTED AS >90 Non    GFR Calc Starting 12/18/2018, serum creatinine based estimated GFR (eGFR) will   be calculated using the Chronic Kidney Disease Epidemiology Collaboration   (CKD EPI) equation.      GFR Estimate If Black >90 >60 mL/min/[1.73_m2]      Comment:      Starting 12/18/2018, serum creatinine based estimated GFR (eGFR) will be   calculated using the Chronic Kidney Disease Epidemiology Collaboration   (CKD-EPI) equation.  CORRECTED ON 07/16 AT 2025: PREVIOUSLY REPORTED AS >90  GFR   Calc Starting 12/18/2018, serum creatinine based estimated GFR (eGFR) will be   calculated using the Chronic Kidney Disease Epidemiology Collaboration (CKD   EPI) equation.      Calcium 9.0 8.5 - 10.1 mg/dL    Bilirubin Total 0.5 0.2 - 1.3 mg/dL    Albumin 3.9 3.4 - 5.0 g/dL    Protein Total 7.3 6.8 - 8.8 g/dL    Alkaline Phosphatase 128 40 - 150 U/L    ALT 96 (H) 0 - 70 U/L    AST 31 0 - 45 U/L   LDL cholesterol direct   Result Value Ref Range    LDL Cholesterol Direct 103 (H) <100 mg/dL      Comment:      Above desirable:  100-129 mg/dl  Borderline High:  130-159 mg/dL  High:             160-189 mg/dL  Very high:       >189 mg/dl         If you have any questions or concerns, please call the clinic at the number listed  above.       Sincerely,        Shell Grande PA-C

## 2019-07-16 NOTE — TELEPHONE ENCOUNTER
----- Message from Shell Grande PA-C sent at 7/16/2019  7:20 AM CDT -----  Please notify patient that his A1c was still quite high at 10.8. His cholesterol levels were also very high. I would like him to start a statin to improve these numbers and decrease heart attack and stroke risk. If patient is agreeable to this I will send medication to pharmacy. Please notify patient that I would not anticipate this medication to be expensive.     Shell Grande PA-C

## 2019-07-17 ENCOUNTER — TELEPHONE (OUTPATIENT)
Dept: FAMILY MEDICINE | Facility: OTHER | Age: 54
End: 2019-07-17

## 2019-07-17 NOTE — TELEPHONE ENCOUNTER
Diabetes EducationDiabetes Education Scheduling Outreach #1:    Call to patient to schedule. Left message with phone number to call to schedule.    Plan for 2nd outreach attempt within 3 business days.    [facility]  Diabetes and Nutrition Scheduling

## 2019-07-23 NOTE — TELEPHONE ENCOUNTER
Diabetes Education Scheduling Outreach #2:    Call to patient to schedule. Left message with phone number to call to schedule.    Jamila Babb  [place] [facility]  Diabetes and Nutrition Scheduling

## 2019-10-02 ENCOUNTER — TELEPHONE (OUTPATIENT)
Dept: FAMILY MEDICINE | Facility: CLINIC | Age: 54
End: 2019-10-02

## 2019-10-02 NOTE — LETTER
[address] [address]  Phone: [phone]  Fax: [phone]  October 2, 2019      Ralph Hagen  [address] [address] [place] [address]      Dear Ralph,    We care about your health and have reviewed your health plan including your medical conditions, medications, and lab results.  Based on this review, it is recommended that you follow up regarding the following health topic(s):  -Diabetes  -Colon Cancer Screening    We recommend you take the following action(s):  -schedule a FOLLOWUP OFFICE APPOINTMENT.  We will perform the following labs:  A1c.  -schedule a COLONOSCOPY to look for colon cancer (due every 10 years or 5 years in higher risk situations.)  Colonoscopies can prevent 90-95% of colon cancer deaths.  Problem lesions can be removed before they ever become cancer.  If you do not wish to do a colonoscopy or cannot afford to do one at this time, there is another option called a Fecal Immunochemical Occult Blood Test (FIT) a take home stool sample kit.  It does not replace the colonoscopy for colorectal cancer screening, but it can detect hidden bleeding in the lower colon.  It does need to be repeated every year and if a positive result is obtained, you would be referred for a colonoscopy.  If you have completed either one of these tests at another facility, please have the records sent to our clinic for our records.     Please call us at [phone] (or use [employer]) to address the above recommendations.     Thank you for trusting [facility] and we appreciate the opportunity to serve you.  We look forward to supporting your healthcare needs in the future.    Healthy Regards,    Your Health Care Team  [facility]

## 2019-10-02 NOTE — TELEPHONE ENCOUNTER
Summary:    Patient is due/failing the following:   Diabetic follow up, A1C and FIT    Action needed:   Patient needs office visit for Diabetic follow up. and complete a FIT test     Type of outreach:    Sent letter.    Questions for provider review:    None                                                                                                                                    Jazmine Delgadillo CMA       Chart routed to Care Team .          Panel Management Review      Patient has the following on his problem list:     Diabetes    ASA: Passed    Last A1C  Lab Results   Component Value Date    A1C 10.8 07/15/2019    A1C 11.7 12/09/2016    A1C 10.5 03/24/2016    A1C 7.9 05/01/2015    A1C 8.1 01/09/2015     A1C tested: FAILED    Last LDL:    Lab Results   Component Value Date    CHOL 224 07/15/2019     Lab Results   Component Value Date    HDL 38 07/15/2019     Lab Results   Component Value Date    LDL  07/15/2019     Cannot estimate LDL when triglyceride exceeds 400 mg/dL     07/15/2019     Lab Results   Component Value Date    TRIG 589 07/15/2019     Lab Results   Component Value Date    CHOLHDLRATIO 6.0 01/02/2014     Lab Results   Component Value Date    NHDL 186 07/15/2019       Is the patient on a Statin? NO             Is the patient on Aspirin? YES    Medications     HMG CoA Reductase Inhibitors     STATIN NOT PRESCRIBED (INTENTIONAL)       Salicylates     aspirin 81 MG tablet             Last three blood pressure readings:  BP Readings from Last 3 Encounters:   07/15/19 112/76   04/04/18 130/82   03/30/18 110/70            Tobacco History:     History   Smoking Status     Former Smoker     Packs/day: 1.00     Years: 20.00     Types: Cigarettes     Quit date: 3/1/2013   Smokeless Tobacco     Never Used     Comment: current E Cig            Composite cancer screening  Chart review shows that this patient is due/due soon for the following Fecal Colorectal (FIT)

## 2019-12-04 ENCOUNTER — TELEPHONE (OUTPATIENT)
Dept: FAMILY MEDICINE | Facility: CLINIC | Age: 54
End: 2019-12-04

## 2019-12-04 NOTE — TELEPHONE ENCOUNTER
Summary:    Patient is due/failing the following:   A1C, FIT and PHYSICAL    Action needed:   Patient needs office visit for Physical and complete a FIT test .    Type of outreach:    Phone, left message for patient to call back.     Questions for provider review:    None                                                                                                                                    Jazmine Delgadillo CMA       Chart routed to Care Team .          Panel Management Review      Patient has the following on his problem list:     Diabetes    ASA: Passed    Last A1C  Lab Results   Component Value Date    A1C 10.8 07/15/2019    A1C 11.7 12/09/2016    A1C 10.5 03/24/2016    A1C 7.9 05/01/2015    A1C 8.1 01/09/2015     A1C tested: FAILED    Last LDL:    Lab Results   Component Value Date    CHOL 224 07/15/2019     Lab Results   Component Value Date    HDL 38 07/15/2019     Lab Results   Component Value Date    LDL  07/15/2019     Cannot estimate LDL when triglyceride exceeds 400 mg/dL     07/15/2019     Lab Results   Component Value Date    TRIG 589 07/15/2019     Lab Results   Component Value Date    CHOLHDLRATIO 6.0 01/02/2014     Lab Results   Component Value Date    NHDL 186 07/15/2019       Is the patient on a Statin? NO             Is the patient on Aspirin? YES    Medications     HMG CoA Reductase Inhibitors     STATIN NOT PRESCRIBED (INTENTIONAL)       Salicylates     aspirin 81 MG tablet             Last three blood pressure readings:  BP Readings from Last 3 Encounters:   07/15/19 112/76   04/04/18 130/82   03/30/18 110/70       Date of last diabetes office visit:   Tobacco History:     History   Smoking Status     Former Smoker     Packs/day: 1.00     Years: 20.00     Types: Cigarettes     Quit date: 3/1/2013   Smokeless Tobacco     Never Used     Comment: current E Cig            Composite cancer screening  Chart review shows that this patient is due/due soon for the following Fecal  Colorectal (FIT)

## 2019-12-04 NOTE — PROGRESS NOTES
Order(s) created erroneously. Erroneous order ID: [id number]   Order canceled by: HARLEY REYES   Order cancel date/time: 12/04/2019 4:11 PM

## 2020-02-07 ENCOUNTER — TELEPHONE (OUTPATIENT)
Dept: FAMILY MEDICINE | Facility: CLINIC | Age: 55
End: 2020-02-07

## 2020-02-07 NOTE — LETTER
[address] [address]  Phone: [phone]  Fax: [phone]  February 25, 2020      Ralph Hagen  [address] [address] [place] [address]      Dear Ralph,    We care about your health and have reviewed your health plan including your medical conditions, medications, and lab results.  Based on this review, it is recommended that you follow up regarding the following health topic(s):  -Diabetes  -Colon Cancer Screening    We recommend you take the following action(s):  -schedule a FOLLOWUP OFFICE APPOINTMENT.  We will perform the following labs:  A1c.  -schedule a COLONOSCOPY to look for colon cancer (due every 10 years or 5 years in higher risk situations.)  Colonoscopies can prevent 90-95% of colon cancer deaths.  Problem lesions can be removed before they ever become cancer.  If you do not wish to do a colonoscopy or cannot afford to do one at this time, there is another option called a Fecal Immunochemical Occult Blood Test (FIT) a take home stool sample kit.  It does not replace the colonoscopy for colorectal cancer screening, but it can detect hidden bleeding in the lower colon.  It does need to be repeated every year and if a positive result is obtained, you would be referred for a colonoscopy.  If you have completed either one of these tests at another facility, please have the records sent to our clinic for our records.     Please call us at [phone] (or use [employer]) to address the above recommendations.     Thank you for trusting [facility] and we appreciate the opportunity to serve you.  We look forward to supporting your healthcare needs in the future.    Healthy Regards,    Your Health Care Team  [facility]

## 2020-02-07 NOTE — TELEPHONE ENCOUNTER
Summary:    Patient is due/failing the following:   Diabetic follow up, eye exam, A1C and FIT    Action needed:   Patient needs office visit for diabetic follow up.    Type of outreach:    Phone, left message for patient to call back.     Questions for provider review:    None                                                                                                                                    Jazmine Delgadillo CMA       Chart routed to Care Team .          Panel Management Review      Patient has the following on his problem list:     Diabetes    ASA: Passed    Last A1C  Lab Results   Component Value Date    A1C 10.8 07/15/2019    A1C 11.7 12/09/2016    A1C 10.5 03/24/2016    A1C 7.9 05/01/2015    A1C 8.1 01/09/2015     A1C tested: FAILED    Last LDL:    Lab Results   Component Value Date    CHOL 224 07/15/2019     Lab Results   Component Value Date    HDL 38 07/15/2019     Lab Results   Component Value Date    LDL  07/15/2019     Cannot estimate LDL when triglyceride exceeds 400 mg/dL     07/15/2019     Lab Results   Component Value Date    TRIG 589 07/15/2019     Lab Results   Component Value Date    CHOLHDLRATIO 6.0 01/02/2014     Lab Results   Component Value Date    NHDL 186 07/15/2019       Is the patient on a Statin? NO             Is the patient on Aspirin? YES    Medications     HMG CoA Reductase Inhibitors     STATIN NOT PRESCRIBED (INTENTIONAL)       Salicylates     aspirin 81 MG tablet             Last three blood pressure readings:  BP Readings from Last 3 Encounters:   07/15/19 112/76   04/04/18 130/82   03/30/18 110/70            Tobacco History:     History   Smoking Status     Former Smoker     Packs/day: 1.00     Years: 20.00     Types: Cigarettes     Quit date: 3/1/2013   Smokeless Tobacco     Never Used     Comment: current E Cig            Composite cancer screening  Chart review shows that this patient is due/due soon for the following Fecal Colorectal (FIT)

## 2020-02-12 ENCOUNTER — HOSPITAL ENCOUNTER (EMERGENCY)
Facility: CLINIC | Age: 55
Discharge: HOME | End: 2020-02-12
Attending: PHYSICIAN ASSISTANT | Admitting: PHYSICIAN ASSISTANT
Payer: COMMERCIAL

## 2020-02-12 VITALS
SYSTOLIC BLOOD PRESSURE: 128 MMHG | OXYGEN SATURATION: 99 % | TEMPERATURE: 97.6 F | DIASTOLIC BLOOD PRESSURE: 88 MMHG | BODY MASS INDEX: 32.69 KG/M2 | HEART RATE: 88 BPM | WEIGHT: 215 LBS | RESPIRATION RATE: 16 BRPM

## 2020-02-12 DIAGNOSIS — N41.0 PROSTATITIS, ACUTE: ICD-10-CM

## 2020-02-12 LAB
ALBUMIN UR-MCNC: NEGATIVE MG/DL
APPEARANCE UR: ABNORMAL
BACTERIA #/AREA URNS HPF: ABNORMAL /HPF
BILIRUB UR QL STRIP: NEGATIVE
COLOR UR AUTO: YELLOW
GLUCOSE UR STRIP-MCNC: >499 MG/DL
HGB UR QL STRIP: NEGATIVE
HYALINE CASTS #/AREA URNS LPF: 1 /LPF (ref 0–2)
KETONES UR STRIP-MCNC: 5 MG/DL
LEUKOCYTE ESTERASE UR QL STRIP: ABNORMAL
MUCOUS THREADS #/AREA URNS LPF: PRESENT /LPF
NITRATE UR QL: NEGATIVE
PH UR STRIP: 5 PH (ref 5–7)
RBC #/AREA URNS AUTO: 2 /HPF (ref 0–2)
SOURCE: ABNORMAL
SP GR UR STRIP: 1.03 (ref 1–1.03)
SQUAMOUS #/AREA URNS AUTO: <1 /HPF (ref 0–1)
UROBILINOGEN UR STRIP-MCNC: 0 MG/DL (ref 0–2)
WBC #/AREA URNS AUTO: 41 /HPF (ref 0–5)

## 2020-02-12 PROCEDURE — 99284 EMERGENCY DEPT VISIT MOD MDM: CPT | Mod: Z6 | Performed by: PHYSICIAN ASSISTANT

## 2020-02-12 PROCEDURE — 87086 URINE CULTURE/COLONY COUNT: CPT | Performed by: PHYSICIAN ASSISTANT

## 2020-02-12 PROCEDURE — 51798 US URINE CAPACITY MEASURE: CPT | Performed by: PHYSICIAN ASSISTANT

## 2020-02-12 PROCEDURE — 99283 EMERGENCY DEPT VISIT LOW MDM: CPT | Mod: 25 | Performed by: PHYSICIAN ASSISTANT

## 2020-02-12 PROCEDURE — 87186 SC STD MICRODIL/AGAR DIL: CPT | Performed by: PHYSICIAN ASSISTANT

## 2020-02-12 PROCEDURE — 81001 URINALYSIS AUTO W/SCOPE: CPT | Performed by: PHYSICIAN ASSISTANT

## 2020-02-12 PROCEDURE — 87088 URINE BACTERIA CULTURE: CPT | Performed by: PHYSICIAN ASSISTANT

## 2020-02-12 RX ORDER — CIPROFLOXACIN 500 MG/1
500 TABLET, FILM COATED ORAL 2 TIMES DAILY
Qty: 20 TABLET | Refills: 0 | Status: SHIPPED | OUTPATIENT
Start: 2020-02-12 | End: 2020-02-22

## 2020-02-12 RX ORDER — PHENAZOPYRIDINE HYDROCHLORIDE 200 MG/1
200 TABLET, FILM COATED ORAL 3 TIMES DAILY
Qty: 9 TABLET | Refills: 0 | Status: SHIPPED | OUTPATIENT
Start: 2020-02-12 | End: 2020-02-15

## 2020-02-12 NOTE — ED AVS SNAPSHOT
[facility] Emergency Department  [address] [place] [address]  Phone:  [phone]  Fax:  [phone]                                    Ralph Hagen   MRN: [id number]    Department:  [facility] Emergency Department   Date of Visit:  2/12/2020           After Visit Summary Signature Page    I have received my discharge instructions, and my questions have been answered. I have discussed any challenges I see with this plan with the nurse or doctor.    ..........................................................................................................................................  Patient/Patient Representative Signature      ..........................................................................................................................................  Patient Representative Print Name and Relationship to Patient    ..................................................               ................................................  Date                                   Time    ..........................................................................................................................................  Reviewed by Signature/Title    ...................................................              ..............................................  Date                                               Time          22EPIC Rev 08/18

## 2020-02-12 NOTE — ED PROVIDER NOTES
History     Chief Complaint   Patient presents with     Dysuria     The history is provided by the patient.     Ralph Hagen is a 54 year old male with a history of Type 1 diabetes who presents to the emergency department for dysuria. For the last month that patient has noticed an increased urination, but last week developed dull pain in his pelvis. For the last five nights he has had to wake up 5-6 times a night to go to urinate and had difficulty starting to urinate. The dysuria and urinary frequency have increased in the last 2 days. Has an appointment in 5 days, but couldn't wait that long. He is only able to urinate small amounts and has the dull pain during and after using the bathroom. When passing a bowel movement he has increased pain and the pain radiates into his testicles. The pain does not change when he presses on his pelvis. His urine has been dark. He denies hematuria, flank pain, fever, nausea, vomiting, or swelling in his genitals. He has had chills and feels like he cannot get warm. He has been eating and drinking less recently due to the pain when using the bathroom. His diabetes management has been difficult because of reduced intake. His father was diagnosed with prostate cancer in his late 60s.     Lab Results   Component Value Date    A1C 10.8 07/15/2019    A1C 11.7 12/09/2016    A1C 10.5 03/24/2016    A1C 7.9 05/01/2015    A1C 8.1 01/09/2015         Allergies:  No Known Allergies    Problem List:    Patient Active Problem List    Diagnosis Date Noted     Type 2 diabetes mellitus with diabetic polyneuropathy, with long-term current use of insulin (H) 03/30/2018     Priority: Medium     Class 1 obesity with serious comorbidity and body mass index (BMI) of 33.0 to 33.9 in adult, unspecified obesity type 10/25/2015     Priority: Medium     Neuropathy 04/07/2015     Priority: Medium     Hyperlipidemia LDL goal <100 03/20/2013     Priority: Medium     Obstructive sleep apnea      Priority:  Medium     PDS   AHI 22.3 CPAP 10 cmH2O          Past Medical History:    Past Medical History:   Diagnosis Date     Diabetes (H) 2007 or 2008     RAMÍREZ (obstructive sleep apnea) 2008       Past Surgical History:    Past Surgical History:   Procedure Laterality Date     TONSILLECTOMY         Family History:    Family History   Problem Relation Age of Onset     Cerebrovascular Disease No family hx of      Hypertension Mother      Hypertension Father      Diabetes No family hx of      C.A.D. No family hx of        Social History:  Marital Status:   [2]  Social History     Tobacco Use     Smoking status: Former Smoker     Packs/day: 1.00     Years: 20.00     Pack years: 20.00     Types: Cigarettes     Last attempt to quit: 3/1/2013     Years since quittin.9     Smokeless tobacco: Never Used     Tobacco comment: current E Cig    Substance Use Topics     Alcohol use: Yes     Alcohol/week: 0.0 standard drinks     Comment: very occ     Drug use: No        Medications:    ciprofloxacin (CIPRO) 500 MG tablet  phenazopyridine (PYRIDIUM) 200 MG tablet  ACE INHIBITOR CONTRAINDICATED AND/OR NOT INDICATED  aspirin 81 MG tablet  blood glucose (ACCU-CHEK SMARTVIEW) test strip  blood glucose monitoring (ACCU-CHEK FASTCLIX) lancets  Cholecalciferol (VITAMIN D) 2000 UNITS tablet  insulin glargine (LANTUS VIAL) 100 UNIT/ML vial  insulin lispro (HUMALOG KWIKPEN) 100 UNIT/ML injection  insulin pen needle (BD FARHAD U/F) 32G X 4 MM  STATIN NOT PRESCRIBED (INTENTIONAL)          Review of Systems   All other systems reviewed and are negative.      Physical Exam   BP: (!) 139/101  Pulse: 106  Temp: 97.6  F (36.4  C)  Resp: 20  Weight: 97.5 kg (215 lb)  SpO2: 99 %      Physical Exam  Vitals signs and nursing note reviewed.   Constitutional:       General: He is not in acute distress.     Appearance: He is well-developed. He is not diaphoretic.   HENT:      Head: Normocephalic and atraumatic.   Eyes:      General: No scleral  icterus.  Neck:      Musculoskeletal: Normal range of motion.   Cardiovascular:      Rate and Rhythm: Normal rate.   Pulmonary:      Effort: Pulmonary effort is normal.   Abdominal:      General: Abdomen is flat. Bowel sounds are normal. There is no distension.      Palpations: There is no mass.      Tenderness: There is no abdominal tenderness. There is no right CVA tenderness, left CVA tenderness, guarding or rebound.      Hernia: No hernia is present.   Genitourinary:     Prostate: Tender (no bogginess). Not enlarged and no nodules present.      Rectum: Normal. No mass, anal fissure, external hemorrhoid or internal hemorrhoid. Normal anal tone.   Musculoskeletal: Normal range of motion.   Skin:     General: Skin is warm and dry.      Findings: No rash.   Neurological:      General: No focal deficit present.      Mental Status: He is alert.      Cranial Nerves: No cranial nerve deficit.   Psychiatric:         Mood and Affect: Mood normal.         Behavior: Behavior normal.         Thought Content: Thought content normal.         Judgment: Judgment normal.         ED Course        Procedures               Critical Care time:  none               Results for orders placed or performed during the hospital encounter of 02/12/20 (from the past 24 hour(s))   UA reflex to Microscopic and Culture   Result Value Ref Range    Color Urine Yellow     Appearance Urine Slightly Cloudy     Glucose Urine >499 (A) NEG^Negative mg/dL    Bilirubin Urine Negative NEG^Negative    Ketones Urine 5 (A) NEG^Negative mg/dL    Specific Gravity Urine 1.028 1.003 - 1.035    Blood Urine Negative NEG^Negative    pH Urine 5.0 5.0 - 7.0 pH    Protein Albumin Urine Negative NEG^Negative mg/dL    Urobilinogen mg/dL 0.0 0.0 - 2.0 mg/dL    Nitrite Urine Negative NEG^Negative    Leukocyte Esterase Urine Trace (A) NEG^Negative    Source Midstream Urine     RBC Urine 2 0 - 2 /HPF    WBC Urine 41 (H) 0 - 5 /HPF    Bacteria Urine Few (A) NEG^Negative /HPF     Squamous Epithelial /HPF Urine <1 0 - 1 /HPF    Mucous Urine Present (A) NEG^Negative /LPF    Hyaline Casts 1 0 - 2 /LPF       Medications - No data to display    Assessments & Plan (with Medical Decision Making)  Prostatitis, acute     54 year old type I diabetic male who presents for evaluation of increasing dysuria, frequency, urgency, and lower pelvic discomfort over the last 1 month, but much worse in the past 2 to 3 days.  Denies fever, chills, nausea, vomiting, or flank pain.  Only able to  void small amounts now.  No prior prostate or UTI history.  Blood sugars have been highly variable but he is covering with insulin dosing.  On exam blood pressure 128/88, temperature 97.6, pulse 88, oxygen saturation 99% on room air.  No significant abdominal tenderness.  No flank pain upon percussion.  CHARISSA with prostate tenderness but no significant bogginess.  No nodule.  Rectum otherwise normal.  Urinalysis with trace leukocyte esterase, 2 RBCs, 41 WBCs, and few bacteria.  Given his exam findings and gradually increasing symptoms, I am most concerned about acute prostatitis.  He did not have any significant urine residual on his bladder scan as noted by the nursing note.  More extended course of antibiotics in the form of ciprofloxacin 500 mg twice daily for 10 days prescribed along with symptom management form of Pyridium 200 mg 3 times daily for 3 days.  Importance of pushing clear fluids discussed.  Indications for ED return reviewed with the patient.  He was in agreement with this plan and was suitable for discharge.     I have reviewed the nursing notes.    I have reviewed the findings, diagnosis, plan and need for follow up with the patient.       Discharge Medication List as of 2/12/2020  1:41 PM      START taking these medications    Details   ciprofloxacin (CIPRO) 500 MG tablet Take 1 tablet (500 mg) by mouth 2 times daily for 10 days, Disp-20 tablet, R-0, E-Prescribe      phenazopyridine (PYRIDIUM) 200 MG  tablet Take 1 tablet (200 mg) by mouth 3 times daily for 3 days, Disp-9 tablet, R-0, E-Prescribe             Final diagnoses:   Prostatitis, acute   This document serves as a record of services personally performed by Tao Lang PA*. It was created on their behalf by Yi Ramos, a trained medical scribe. The creation of this record is based on the provider's personal observations and the statements of the patient. This document has been checked and approved by the attending provider.  Note: Chart documentation done in part with Dragon Voice Recognition software. Although reviewed after completion, some word and grammatical errors may remain.    2/12/2020   Tao Lang PA-C   [facility] EMERGENCY DEPARTMENT     Tao Lang PA-C  02/12/20 [id number]

## 2020-02-12 NOTE — LETTER
February 12, 2020      To Whom It May Concern:      Ralph Hagen was seen in our Emergency Department today, 02/12/20.  I expect his condition to improve over the next couple days.  He may return to work when improved.    Sincerely,            Tao Lang PA-C

## 2020-02-12 NOTE — ED TRIAGE NOTES
Patient presents with concerns for pelvic pain, severe pain with urination and frequency. He reports sx for maybe a month but increasing over the past couple days. Marilia Wild RN

## 2020-02-13 LAB
BACTERIA SPEC CULT: ABNORMAL
Lab: ABNORMAL
SPECIMEN SOURCE: ABNORMAL

## 2020-02-13 NOTE — RESULT ENCOUNTER NOTE
Emergency Dept/Urgent Care discharge antibiotic: Ciprofloxacin (Cipro) 500 mg tablet, 1 tablet (500 mg) by mouth 2 times daily for 10 days.  Date of Rx (If Applicable): 2/12  Recommendations per [facility] ED Lab result protocol - Urine culture protocol.

## 2020-02-20 ENCOUNTER — TELEPHONE (OUTPATIENT)
Dept: EMERGENCY MEDICINE | Facility: CLINIC | Age: 55
End: 2020-02-20

## 2020-02-20 NOTE — TELEPHONE ENCOUNTER
[facility] Emergency Department Lab result notification:    Reason for call  Assess patient  Lab Result  Final Urine Culture Report on 2/13/20  Emergency Dept/Urgent Care discharge antibiotic prescribed: Ciprofloxacin (Cipro) 500 mg tablet, 1 tablet (500 mg) by mouth 2 times daily for 10 days.  #1. Bacteria, >100,000 colonies/mL Enterococcus faecalis, is SUSCEPTIBLE to Antibiotic per Micro lab.    As per [facility] ED Lab Result protocol, no change in antibiotic therapy.  ED visit Date: 2/12/20  Symptoms reported at ED visit 54 year old type I diabetic male who presents for evaluation of increasing dysuria, frequency, urgency, and lower pelvic discomfort over the last 1 month, but much worse in the past 2 to 3 days.  Denies fever, chills, nausea, vomiting, or flank pain.  Only able to  void small amounts now.  No prior prostate or UTI history.  Blood sugars have been highly variable but he is covering with insulin dosing.  On exam blood pressure 128/88, temperature 97.6, pulse 88, oxygen saturation 99% on room air.  No significant abdominal tenderness.  No flank pain upon percussion.  CHARISSA with prostate tenderness but no significant bogginess.  No nodule.  Rectum otherwise normal.  Urinalysis with trace leukocyte esterase, 2 RBCs, 41 WBCs, and few bacteria.  Given his exam findings and gradually increasing symptoms, I am most concerned about acute prostatitis.  He did not have any significant urine residual on his bladder scan as noted by the nursing note.  More extended course of antibiotics in the form of ciprofloxacin 500 mg twice daily for 10 days prescribed along with symptom management form of Pyridium 200 mg 3 times daily for 3 days.  Importance of pushing clear fluids discussed.  Indications for ED return reviewed with the patient.  He was in agreement with this plan and was suitable for discharge.   Miscellaneous information      Current symptoms  5:23 pm Message left to call us back at  [phone], between 10 am and 6 pm, seven days a week. May leave a message 24/7, if no one available.     RN Assessment (Patient s current Symptoms), include time called.  [Insert Left message here if message left]  Pt returned my call and said he is feeling much better. He is finishing the antibiotics and will f/u with his provider.     RN Recommendations/Instructions per [facility] ED lab result protocol  Patient notified of lab result and treatment recommendations.     Please Contact your PCP clinic or return to the Emergency department if your:    Symptoms return.    Symptoms do not improve after 3 days on antibiotic.    Symptoms do not resolve after completing antibiotic.    Symptoms worsen or other concerning symptom's.    PCP follow-up Questions asked: YES       Danielle Acharya RN  [facility]   Lung Nodule and ED Lab Result F/U RN  Epic pool (ED late result f/u RN): P [id number]  # [phone]

## 2020-03-12 ENCOUNTER — TELEPHONE (OUTPATIENT)
Dept: FAMILY MEDICINE | Facility: CLINIC | Age: 55
End: 2020-03-12

## 2020-03-12 NOTE — TELEPHONE ENCOUNTER
Summary:    Patient is due/failing the following:   Diabetic follow up and A1C    Action needed:   Patient needs office visit for Diabetic follow up.    Type of outreach:    Phone, left message for patient to call back.     Questions for provider review:    None                                                                                                                                    Jazmine Delgadillo CMA       Chart routed to Care Team .

## 2020-04-02 ENCOUNTER — TELEPHONE (OUTPATIENT)
Dept: FAMILY MEDICINE | Facility: CLINIC | Age: 55
End: 2020-04-02

## 2020-04-02 NOTE — TELEPHONE ENCOUNTER
Reason for Call:  Other     Detailed comments: Requesting a note for his employer stating due to his diabetes he is at a higher risk for Covid19.  Letter can be addressed to [employer].    Please fax to Ralph at: [phone]    Phone Number Patient can be reached at: Home number on file [phone] (home)    Best Time: any    Can we leave a detailed message on this number? YES    Call taken on 4/2/2020 at 9:13 AM by Jamila Beard

## 2020-04-06 ENCOUNTER — HOSPITAL ENCOUNTER (EMERGENCY)
Facility: CLINIC | Age: 55
Discharge: HOME | End: 2020-04-06
Attending: PHYSICIAN ASSISTANT | Admitting: PHYSICIAN ASSISTANT
Payer: COMMERCIAL

## 2020-04-06 VITALS
DIASTOLIC BLOOD PRESSURE: 71 MMHG | SYSTOLIC BLOOD PRESSURE: 114 MMHG | OXYGEN SATURATION: 97 % | RESPIRATION RATE: 18 BRPM | TEMPERATURE: 98 F

## 2020-04-06 DIAGNOSIS — L50.9 URTICARIA: ICD-10-CM

## 2020-04-06 PROCEDURE — 99284 EMERGENCY DEPT VISIT MOD MDM: CPT | Mod: Z6 | Performed by: PHYSICIAN ASSISTANT

## 2020-04-06 PROCEDURE — 99282 EMERGENCY DEPT VISIT SF MDM: CPT | Performed by: PHYSICIAN ASSISTANT

## 2020-04-06 RX ORDER — PREDNISONE 20 MG/1
TABLET ORAL
Qty: 10 TABLET | Refills: 0 | Status: SHIPPED | OUTPATIENT
Start: 2020-04-06

## 2020-04-06 NOTE — ED PROVIDER NOTES
History     Chief Complaint   Patient presents with     Rash     HPI  Ralph Hagen is a 54 year old male who presents to the emergency department complaining of a rash. The patient reports 2 days ago he woke up with an itchy rash on his arms that subsequently spread to his legs and entire body over the last couple days.  He has been taking Benadryl without any relief.  It is extremely itchy but denies any pain or drainage.  He has not had any fevers, no difficulties breathing, no throat tightness or discomfort.  No vomiting or diarrhea.  He denies any known environmental exposures to explain the rash other than his dogs being outside and may have brought something into the house that got on his skin.  He has no known allergies.  He is a type II diabetic, insulin-dependent.    Allergies:  No Known Allergies    Problem List:    Patient Active Problem List    Diagnosis Date Noted     Type 2 diabetes mellitus with diabetic polyneuropathy, with long-term current use of insulin (H) 03/30/2018     Priority: Medium     Class 1 obesity with serious comorbidity and body mass index (BMI) of 33.0 to 33.9 in adult, unspecified obesity type 10/25/2015     Priority: Medium     Neuropathy 04/07/2015     Priority: Medium     Hyperlipidemia LDL goal <100 03/20/2013     Priority: Medium     Obstructive sleep apnea      Priority: Medium     PDS 2008  AHI 22.3 CPAP 10 cmH2O          Past Medical History:    Past Medical History:   Diagnosis Date     Diabetes (H) 2007 or 2008     RAMÍREZ (obstructive sleep apnea) 2008       Past Surgical History:    Past Surgical History:   Procedure Laterality Date     TONSILLECTOMY  1998       Family History:    Family History   Problem Relation Age of Onset     Cerebrovascular Disease No family hx of      Hypertension Mother      Hypertension Father      Diabetes No family hx of      C.A.D. No family hx of        Social History:  Marital Status:   [2]  Social History     Tobacco Use      Smoking status: Former Smoker     Packs/day: 1.00     Years: 20.00     Pack years: 20.00     Types: Cigarettes     Last attempt to quit: 3/1/2013     Years since quittin.1     Smokeless tobacco: Never Used     Tobacco comment: current E Cig    Substance Use Topics     Alcohol use: Yes     Alcohol/week: 0.0 standard drinks     Comment: very occ     Drug use: No        Medications:    predniSONE (DELTASONE) 20 MG tablet  ACE INHIBITOR CONTRAINDICATED AND/OR NOT INDICATED  aspirin 81 MG tablet  blood glucose (ACCU-CHEK SMARTVIEW) test strip  blood glucose monitoring (ACCU-CHEK FASTCLIX) lancets  Cholecalciferol (VITAMIN D) 2000 UNITS tablet  insulin glargine (LANTUS VIAL) 100 UNIT/ML vial  insulin lispro (HUMALOG KWIKPEN) 100 UNIT/ML injection  insulin pen needle (BD FARHAD U/F) 32G X 4 MM  STATIN NOT PRESCRIBED (INTENTIONAL)          Review of Systems   All other systems reviewed and are negative.      Physical Exam   BP: 114/71  Heart Rate: 140  Temp: 98  F (36.7  C)  Resp: 18  SpO2: 97 %      Physical Exam  Vitals signs and nursing note reviewed.   Constitutional:       General: He is not in acute distress.     Appearance: Normal appearance. He is well-developed. He is not ill-appearing, toxic-appearing or diaphoretic.   HENT:      Head: Normocephalic and atraumatic.      Mouth/Throat:      Mouth: Mucous membranes are moist.      Pharynx: Oropharynx is clear.   Eyes:      Conjunctiva/sclera: Conjunctivae normal.      Pupils: Pupils are equal, round, and reactive to light.   Neck:      Musculoskeletal: Neck supple.   Cardiovascular:      Rate and Rhythm: Regular rhythm.      Heart sounds: Normal heart sounds.   Pulmonary:      Effort: Pulmonary effort is normal. No respiratory distress.      Breath sounds: Normal breath sounds.   Musculoskeletal:         General: No deformity.   Skin:     General: Skin is warm and dry.      Comments: Diffuse urticarial rash to upper extremities, entire torso.  There are some  dermatographic patterns where he scratched himself.   Neurological:      Mental Status: He is alert and oriented to person, place, and time.      Coordination: Coordination normal.   Psychiatric:         Mood and Affect: Mood normal.         Behavior: Behavior normal.         ED Course        Procedures      No results found for this or any previous visit (from the past 24 hour(s)).    Medications - No data to display    Assessments & Plan (with Medical Decision Making)  Ralph Hagen is a 54 year old male who presented to the ED for concerns of a rash that started 2 days ago.  Patient complains it is diffuse and itchy but denies any other symptoms.  On arrival to the ED vital signs notable for tachycardia, though patient was anxious and pacing during initial evaluation.  Heart rate did improve when I evaluated him and he had calm down.  Otherwise vital signs normal.  On exam today he had a diffuse urticarial rash to torso and extremities.  There is no evidence of anaphylaxis with respiratory involvement or GI involvement, throat looks normal.  Cause of this urticaria is unclear but patient felt comfortable with plan to treat with short course of steroids.  He was encouraged to try Zyrtec daily and then Benadryl as needed at night for itching.  Discussed with him side effects of steroids and advised to monitor sugars closely.  I advised follow-up in 2 days if symptoms are not improving.  I went over warning signs and symptoms of when he should return to the ED.  All questions were answered and the patient was discharged home in suitable condition.     I have reviewed the nursing notes.    I have reviewed the findings, diagnosis, plan and need for follow up with the patient.    New Prescriptions    PREDNISONE (DELTASONE) 20 MG TABLET    Take two tablets (= 40mg) each day for 5 (five) days       Final diagnoses:   Urticaria     Note: Chart documentation done in part with Dragon Voice Recognition software. Although  reviewed after completion, some word and grammatical errors may remain.     4/6/2020   [facility] EMERGENCY DEPARTMENT     Angelica Morgan PA-C  04/06/20 [id number]

## 2020-04-06 NOTE — DISCHARGE INSTRUCTIONS
Please start taking the prednisone to see if that will help your rash.  Be aware it can increase your blood sugars so watch these closely.  Start taking Zyrtec daily and Benadryl at night as needed for itching.  Follow-up in the clinic in 2 days if symptoms are not improving.  If you develop any worsening concerns please return to the emergency department.    Thank you for choosing [facility]'s Emergency Department. It was a pleasure taking care of you today. If you have any questions, please call [phone].    Angelica Morgan PA-C

## 2020-04-06 NOTE — ED AVS SNAPSHOT
[facility] Emergency Department  [address] [place] [address]  Phone:  [phone]  Fax:  [phone]                                    Ralph Hagen   MRN: [id number]    Department:  [facility] Emergency Department   Date of Visit:  4/6/2020           After Visit Summary Signature Page    I have received my discharge instructions, and my questions have been answered. I have discussed any challenges I see with this plan with the nurse or doctor.    ..........................................................................................................................................  Patient/Patient Representative Signature      ..........................................................................................................................................  Patient Representative Print Name and Relationship to Patient    ..................................................               ................................................  Date                                   Time    ..........................................................................................................................................  Reviewed by Signature/Title    ...................................................              ..............................................  Date                                               Time          22EPIC Rev 08/18

## 2020-06-26 ENCOUNTER — TELEPHONE (OUTPATIENT)
Dept: EDUCATION SERVICES | Facility: CLINIC | Age: 55
End: 2020-06-26

## 2020-06-26 NOTE — TELEPHONE ENCOUNTER
Received message from scheduling:     Ralph Hagen  Male, 55 year old, 1965  [phone]  MRN:   [id number]    Humalog/ novolog  & lantus      Pt is running out of insulin. He recently lost his job and is trying to get some alternative medical coverage  but needs some help with the cost of his insulin.  Was looking for some type of program or assistance, please advise.        PHONE CALL TO PATIENT: Reached Ralph by phone but he was just getting on the Free[place] so could not take notes.  Called him back and left message after discussion with phone number.    Recommended he start by checking out Rebecca Nordisk, Jacqueline Eam and/or [employer] (makers of his insulin) to see if eligible for any of the assistance programs.      If he is not or may run out, encouraged him to next reach out to his pharmacy since the may also be following an emergency insulin replacement program.    If none of above work out, told him to call on Monday.  Can get [facility] insulin for $25-45 for a vial or box respectively, but would need to do a consult/follow up to determine his insulin dose and adjustments since he would be changing over to NPH or Insulin Regular.  Has referral last sent within the year for diabetes ed from 7/15/19 (good through 7/14/20).    Renée Barrett RDN, LD, [facility]

## 2021-03-17 ENCOUNTER — TRANSFERRED RECORDS (OUTPATIENT)
Dept: HEALTH INFORMATION MANAGEMENT | Facility: CLINIC | Age: 56
End: 2021-03-17

## 2022-06-06 ENCOUNTER — TELEPHONE (OUTPATIENT)
Dept: FAMILY MEDICINE | Facility: CLINIC | Age: 57
End: 2022-06-06
Payer: COMMERCIAL

## 2022-06-06 DIAGNOSIS — U07.1 PNEUMONIA DUE TO 2019 NOVEL CORONAVIRUS: Primary | ICD-10-CM

## 2022-06-06 DIAGNOSIS — J12.82 PNEUMONIA DUE TO 2019 NOVEL CORONAVIRUS: Primary | ICD-10-CM

## 2022-06-06 NOTE — TELEPHONE ENCOUNTER
REFERRAL NEEDED ASAP    Wife (Mabel) needing a referral for [facility] ([facility] [place]) to be seen post Covid [address]. Phone# [phone] option # 0    Attention to:  Tangela Gave when completed ASAP please.    Call wife as soon as this is completed please so that she can schedule this appointment    Wife Mabel's Cell phone#  [phone] may leave a message on      Tess Cheung     [facility]

## 2022-07-07 ENCOUNTER — REFERRAL (OUTPATIENT)
Dept: TRANSPLANT | Facility: CLINIC | Age: 57
End: 2022-07-07

## 2022-07-15 ENCOUNTER — TELEPHONE (OUTPATIENT)
Dept: TRANSPLANT | Facility: CLINIC | Age: 57
End: 2022-07-15

## 2022-07-15 NOTE — TELEPHONE ENCOUNTER
Patient is being seen at [facility] for lung transplant services.   Wanted to do participate in COVID study, but was denied, due to medical history. Patient states he is supposed to see Pulmonologist and Infectious Disease.     Will send message to scheduling pool and let patients Coordinator, Monique know.     TREVA Johnson, LPN       Solid Organ Transplant

## 2022-11-16 ENCOUNTER — TELEPHONE (OUTPATIENT)
Dept: TRANSPLANT | Facility: CLINIC | Age: 57
End: 2022-11-16

## 2022-11-16 NOTE — TELEPHONE ENCOUNTER
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 He is looped in to their transplant program. Originally was referred to our program in July - but in discussing with Ralph it was for a study and not necessarily for transplant referral. Let him know that he should reach out in the future should he want to explore transplant at [facility], direct contact information for coordinator provided.

## 2022-12-19 ENCOUNTER — TELEPHONE (OUTPATIENT)
Dept: CALL CENTER | Age: 57
End: 2022-12-19

## 2022-12-19 NOTE — TELEPHONE ENCOUNTER
[facility] Call Center    Phone Message    May a detailed message be left on voicemail: yes     Reason for Call: Other: pt calling in would like to see someone about the lack of breathing he can do from covid and many trach changes. please reach out to patient he would like to be seen asap he can only take in about 30% normal as 70% is blocked with cartalidge. Thank you      Action Taken: Message routed to:  [facility] ([facility]): ent    Travel Screening: Not Applicable

## 2022-12-22 NOTE — TELEPHONE ENCOUNTER
Writer called and LM for patient offering two different dates and times    12/23/22 at 0800 or   1/13/23 at 0800.     Will send [employer] message as well.    Vivienne Verduzco RN on 12/22/2022 at 10:40 AM

## 2022-12-27 NOTE — TELEPHONE ENCOUNTER
"FUTURE VISIT INFORMATION      FUTURE VISIT INFORMATION:    Date: 3/21/23    Time: 8am    [place] [facility] [place]  REFERRAL INFORMATION:    Referring provider:      Referring providers clinic:      Reason for visit/diagnosis  Long term covid issues several trachs 70 % closed throat due to cartalidge fron those trachs. breathing issues. Thi writer can hear having trouble catching breath.    RECORDS REQUESTED FROM:       Clinic name Comments Records Status Imaging Status   [facility] 10/7/22, 10/6/22, 8/31/22- note with Castillo Chilel MD  More note and OV until 1/4/22    Images:  10/7/22- FL Video swallow   10/7/22-  FL Esophagram   10/6/22- CT Chest     Procedures:   10/6/22- PFT   1/4/22- PFT CE  2/7/23- Pending req  - In Pacs   [facility]  5/24/22- XR Chest   5/8/22- CT Chest  5/8/22- XR Chest CE  2/7/23- Pending Req  - IN PACS    [facility]  1/17/23- ED    Images:  1/17/23- XR Chest   1/17/23- XR Chest   5/16/22- XR Chest   5/7/22- XR Chest   4/2/22- CT Neck  CE 2/7/23- Pending req  - IN PACS   [facility]  4/22/22- CT Chest   4/20/22- XR Chest   4/17/22- XR Video swallow   4/15/22- XR Chest   4/15/22- CT Neck  CE 2/7/23- Pending REQ -  IN PACS                 February 7, 2023 9:30 AM - Faxed a request to [facility] to push Image to [place] PACS- Laura   February 7, 2023 2:33 PM - [email or web address] pacs and attached it to patient- Laura   February 20, 2023 at 9:41 AM - [facility] images resolved in PACS -Cristina  February 27, 2023 at 2:49 PM - Contact [place] for images pushed and request sent on 2/7/23. Spoke to Marleni, she stated no request was received. Fax number was verify and correct. Send email of previous request sent on 2/7 to email at \"[facility]\" <[email or web address]>  -Cristina  March 2, 2023 at 9:19 AM - [facility] images resolved and attached to patient in PACS -Cristina  "

## 2023-03-20 ENCOUNTER — PATIENT OUTREACH (OUTPATIENT)
Dept: OTOLARYNGOLOGY | Facility: CLINIC | Age: 58
End: 2023-03-20
Payer: COMMERCIAL

## 2023-03-21 ENCOUNTER — PRE VISIT (OUTPATIENT)
Dept: OTOLARYNGOLOGY | Facility: CLINIC | Age: 58
End: 2023-03-21

## 2024-11-12 ENCOUNTER — TRANSCRIBE ORDERS (OUTPATIENT)
Dept: OTHER | Age: 59
End: 2024-11-12

## 2024-11-12 DIAGNOSIS — G47.33 OSA (OBSTRUCTIVE SLEEP APNEA): Primary | ICD-10-CM

## 2025-04-30 NOTE — TELEPHONE ENCOUNTER
FUTURE VISIT INFORMATION        FUTURE VISIT INFORMATION:  Date: 3/21/23  Time: 8am  [place] [facility] [place] INFORMATION:  Referring provider:    Referring providers clinic:    Reason for visit/diagnosis  Long term covid issues several trachs 70 % closed throat due to cartalidge fron those trachs. breathing issues. Thi writer can hear having trouble catching breath.     RECORDS REQUESTED FROM:         Clinic name Comments Records Status Imaging Status   [facility] 11/12/24 ENT OV -Consuelo Greene M.D.    11/12/24 PULM OV Hilda Guillory M.D.    10/7/22 ENT note with Castillo Chilel MD     Images:  10/7/22- FL Video swallow   10/7/22-  FL Esophagram   10/6/22- CT Chest      Procedures:   10/6/22 and 1/4/22- PFT CE PACS   [facility]  5/24/22 and 5/8/22 XR Chest   5/8/22- CT Chest    9/1/22 OV: Val Rausch MS, CCC/SLP  CE  PACS   [facility] 1/17/23- ED     Images:  1/17/23- 4/15/22 XR Chest   4/22/22 CT chest  4/17/22- XR Video swallow  4/2/22 and 4/15/22 CT Neck   *more images in PACS CE PACS

## 2025-06-02 ENCOUNTER — TELEPHONE (OUTPATIENT)
Dept: OTOLARYNGOLOGY | Facility: CLINIC | Age: 60
End: 2025-06-02
Payer: MEDICARE

## 2025-06-21 ENCOUNTER — HEALTH MAINTENANCE LETTER (OUTPATIENT)
Age: 60
End: 2025-06-21

## 2025-07-01 ENCOUNTER — OFFICE VISIT (OUTPATIENT)
Dept: OTOLARYNGOLOGY | Facility: CLINIC | Age: 60
End: 2025-07-01
Payer: MEDICARE

## 2025-07-01 VITALS
HEART RATE: 82 BPM | SYSTOLIC BLOOD PRESSURE: 152 MMHG | BODY MASS INDEX: 34.21 KG/M2 | OXYGEN SATURATION: 94 % | WEIGHT: 218 LBS | DIASTOLIC BLOOD PRESSURE: 88 MMHG | HEIGHT: 67 IN

## 2025-07-01 DIAGNOSIS — R49.0 DYSPHONIA: Primary | ICD-10-CM

## 2025-07-01 DIAGNOSIS — J38.3 PARADOXICAL VOCAL FOLD MOTION DISORDER: ICD-10-CM

## 2025-07-01 DIAGNOSIS — K21.9 GASTROESOPHAGEAL REFLUX DISEASE, UNSPECIFIED WHETHER ESOPHAGITIS PRESENT: ICD-10-CM

## 2025-07-01 DIAGNOSIS — B49 FUNGAL INFECTION: ICD-10-CM

## 2025-07-01 DIAGNOSIS — K21.9 LARYNGOPHARYNGEAL REFLUX: ICD-10-CM

## 2025-07-01 DIAGNOSIS — R06.83 SNORING: ICD-10-CM

## 2025-07-01 RX ORDER — LORAZEPAM 1 MG/1
1 TABLET ORAL
COMMUNITY
Start: 2024-05-01

## 2025-07-01 RX ORDER — NYSTATIN 100000 [USP'U]/ML
SUSPENSION ORAL
Qty: 280 ML | Refills: 0 | Status: SHIPPED | OUTPATIENT
Start: 2025-07-01 | End: 2025-07-08

## 2025-07-01 RX ORDER — OMEPRAZOLE 40 MG/1
40 CAPSULE, DELAYED RELEASE ORAL 2 TIMES DAILY
Qty: 60 CAPSULE | Refills: 3 | Status: SHIPPED | OUTPATIENT
Start: 2025-07-01

## 2025-07-01 RX ORDER — APIXABAN 5 MG/1
5 TABLET, FILM COATED ORAL 2 TIMES DAILY
COMMUNITY

## 2025-07-01 RX ORDER — FLUCONAZOLE 100 MG/1
TABLET ORAL
Qty: 11 TABLET | Refills: 0 | Status: SHIPPED | OUTPATIENT
Start: 2025-07-01

## 2025-07-01 RX ORDER — HYDROXYZINE HYDROCHLORIDE 25 MG/1
25 TABLET, FILM COATED ORAL DAILY PRN
COMMUNITY

## 2025-07-01 RX ORDER — FLUTICASONE FUROATE, UMECLIDINIUM BROMIDE AND VILANTEROL TRIFENATATE 100; 62.5; 25 UG/1; UG/1; UG/1
1 POWDER RESPIRATORY (INHALATION) DAILY
COMMUNITY

## 2025-07-01 NOTE — PROGRESS NOTES
Outpatient Speech Language Therapy Evaluation  PLAN OF TREATMENT FOR OUTPATIENT REHABILITATION  (COMPLETE FOR INITIAL CLAIMS ONLY)  Patient's Last Name, First Name, M.I.  YOB: 1965  XiaoRalph  KE                        Provider's Name  Joss JUHI Branch, SLP Medical Record No.  [id number]                               Onset Date:  7/01/25   Start of Care Date: 7/01/25     Type: Speech Language Therapy Medical Diagnosis: No primary diagnosis found.                        Therapy Diagnosis:  No primary diagnosis found.   Visits from SOC:  1   _________________________________________________________________________________  Plan of Treatment:   Speech therapy    DURATION/FREQUENCY OF TREATMENT  Six weekly, one-hour sessions, with two monthly one-hour follow-up sessions    PROGNOSIS  Good prognosis for voice improvement with speech therapy and regular practice of therapeutic activities.    BARRIERS TO LEARNING/TEACHING AND LEARNING NEEDS  None/Unremarkable    Goals:  Patient goal:    To understand the problem and fix it as much as possible     Short-term goal(s): Within the first 4 sessions, Ralph will:  -- demonstrate silent inhalation and abdominal breathing pattern in order to optimize breathing mechanics with 90% accy and min cues.  -- demonstrate relaxed throat breathing and laryngeal release in order to reduce upper airway constriction with 90% accy and min cues.  -- accurately self-identify target vs. habitual voice quality in >80% of utterances, with demonstrated ability to volitionally alter voice quality with 90% accy when prompted.  -- coordinate appropriate air flow levels with forward resonance during phonation in order to minimize laryngeal compensation and effort with 90% accy.    Long-term goal(s): In 3 months, Ralph will:  -- report a 90% resolution of voice and breathing symptoms during a week of  performing typical personal, social, and professional activities.  _________________________________________________________________________________    I CERTIFY THE NEED FOR THESE SERVICES FURNISHED UNDER        THIS PLAN OF TREATMENT AND WHILE UNDER MY CARE     (Physician attestation of this document indicates review and certification of the therapy plan).     Certification date from: 7/01/25  Certification date to: 9/29/25    Referring Provider: [facility]  CLINICAL EVALUATION REPORT    Patient: Ralph Hagen  Date of Service: 7/1/2025  Seen in conjunction with: Dr. Leslie Fitzgerald  Referring physician: Dr. Baez    HISTORY  PATIENT INFORMATION  Ralph Hagen is a 60 year old presenting today for initial evaluation of voice and resonance. Salient details of his symptom history are as follows:    The patient's primary concerns today are difficulty breathing and hoarseness. Symptoms began in the context of extended intubation/ventilation and ECMO in 2020 early in the pandemic. 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 This has been primarily managed by Dr. Baez (laryngology) and by pulmonary medicine at the [facility]. Most recent tracheostomy has been closed since 2023, and most recent follow up with Dr. Baez was in November 2024. 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    Regarding breathing, patient reports that he has difficulty inhaling with inspiratory stridor with even light physical exertion (walking down the [place]). Symptoms have been gradually worsening over the past 6 months. Symptoms seem to come on gradually with increasing oxygen demand, and resolve gradually as demand lessens. He  denies breathing difficulties at rest.    Regarding voice, he reports that he experiences near total aphonia several times per month, and is rough, but louder at other times. Voice quality is never totally normal. He feels that this pattern has been generally stable over the past 6 months.    Regarding swallowing, patient reports increased swallowing effort with saliva and with solids (liquids are okay). He rarely experiences coughing with eating/drinking, and has not had unexplained weight loss. He did have aspiration pneumonia last year, which was presumed to be related to aspiration of reflux. Patient does endorse waking up with volume oral reflux 3-4 times per week. He does not currently take medication for reflux symptoms other than Mylanta and Tums as needed.     He denies throat discomfort. He does endorse coughing difficulties associated with ongoing lower airway processes, for which he continues to follow with Pulmonary medicine.     OBJECTIVE FINDINGS  PATIENT REPORTED MEASURES  Patient Supplied Answers To [facility] Intake Voice Questionnaire       No data to display                 Patient Supplied Answers To VHI Questionnaire       No data to display                 Patient Supplied Answers To CSI Questionnaire       No data to display                 Patient Supplied Answers To EAT Questionnaire       No data to display                  Self-Ratings as discussed with patient:       No data to display                 PERCEPTUAL EVALUATION (13343)  VOICE/ SPEECH/ NON-COMMUNICATIVE LARYNGEAL BEHAVIORS EVALUATION  Breathing pattern:   incoordination with phonation, insufficient breath stream for duration of phonation, and intermittent inspiratory stridor that resolves with rounded lip inhalation and with   Cough/ Throat clear:  not observed today   Ralph states today is a better than normal voice day, with clinician observing voice quality characterized by:  Roughness: Moderate to severe  Breathiness: Mild to  "moderate Consistent  Strain: Moderate Consistent  Habitual pitch is perceptually mildly high  Loudness is moderately reduced  Brief instances of clearer voicing    GRADE, ROUGHNESS, BREATHINESS, ASTHENIA, STRAIN  (GRBAS) scale:  G(3)R(2)B(2)A(0)S(2)    Laryngeal Function Studies: Deferred due to patient time constraint; will complete at initial voice therapy session.    LARYNGEAL EXAMINATION  Procedure: Flexible endoscopy with chip-tip technology with stroboscopy, right nostril; topical anesthesia with 3% Lidocaine and 0.25% phenylephrine was applied.   Performed by: Dr. Leslie Fitzgerald  Verbal consent was obtained and witnessed prior to this procedure.   A time-out was performed, verifying patient, procedure, and site.   This exam shows:  Velar Function: not assessed  Secretions:  intermittent collection of thickened secretions on the vocal folds  Laryngeal Mucosa: posterior commissure hypertrophy; post-cricoid pachydermia   Vocal fold mucosa:    RTVF - Diffusely edematous and erythematous; smooth, straight edges  LTVF - Diffusely edematous and erythematous; smooth, straight edges  Vocal fold function:   Fuentes movement bilaterally  Limited range of motion with abduction (just lateral to paramedian position; reasonable glottic patency)  Narrow Band Imaging (NBI) demonstrated: diffuse, small whitish patching throughout the laryngeal area  Airway  Brief glimpses of subglottis and proximal trachea show known A-Frame deformity  Episodic breathing symptoms were elicited by having patient bend to attempt to tie shoes  Adduction of vocal folds during inhalation with associated inspiratory stridor  Was able to resolve this with rescue breathing strategies of:  Rounded lip inhalation  Exhalation with pursed lips and \"sh\"  Elongation of the vocal folds for pitch increase is limited  Moderate four-way constriction of the supraglottic larynx during connected speech  The addition of stroboscopy provided the following " information:  Vibratory Behavior: present bilaterally  Amplitude Right: WNL  Amplitude Left: WNL  Mucosal Wave Right: Mildly reduced  Mucosal Wave Left: Mildly Reduced  Glottic closure:  complete with open phase predominance    Symmetry:  mostly symmetric  Periodicity: mostly periodic     STIMULABILITY: results of therapy probes during perceptual and laryngeal evaluation demonstrate improvement with use of rescue breathing strategies    ASSESSMENT / PLAN  IMPRESSIONS: Ralph Hagen is a 60 year old with Shortness Of Breath (R06.02), Stridor (R06.1), and Dysphonia (R49.0) characterized by roughness, breathiness and strain.  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 Whitish patching throughout laryngeal area is suggestive of fungal infection. In addition, frequent volume oral reflux likely exacerbates dysphonia and breathing difficulties (both PVFM and other respiratory). Finally, degree of breathiness was not accounted for by laryngeal exam, suggesting that posterior insufficiency obscured by arytenoids could be contributing to dysphonia.    Based on these findings, Dr. Fitzgerald has recommended initiation of anti fungal medication with follow up in 4-6 weeks, initiation of omeprazole with referral to gastroenterology. The patient will also continue to follow with Dr. Baez for regular laryngology management. Finally, a course of speech therapy is recommended in order to optimize laryngeal respiratory mechanics and phonatory efficiency.  He demonstrates a Good prognosis for improvement given adherence to therapeutic recommendations.   Positive indicators: positive response to therapy probes diagnosis is known to respond to treatment  Negative indicators: None  DURATION / FREQUENCY: 6 bi-weekly therapy  sessions    Goals:  Patient goal:    To understand the problem and fix it as much as possible     Short-term goal(s): Within the first 4 sessions, Ralph will:  -- demonstrate silent inhalation and abdominal breathing pattern in order to optimize breathing mechanics with 90% accy and min cues.  -- demonstrate relaxed throat breathing and laryngeal release in order to reduce upper airway constriction with 90% accy and min cues.  -- accurately self-identify target vs. habitual voice quality in >80% of utterances, with demonstrated ability to volitionally alter voice quality with 90% accy when prompted.  -- coordinate appropriate air flow levels with forward resonance during phonation in order to minimize laryngeal compensation and effort with 90% accy.    Long-term goal(s): In 3 months, Ralph will:  -- report a 90% resolution of voice and breathing symptoms during a week of performing typical personal, social, and professional activities.    This treatment plan was developed with the patient who agreed with the recommendations.    TOTAL SERVICE TIME: 60 minutes  EVALUATION OF VOICE AND RESONANCE (88771)  NO CHARGE FACILITY FEE (77595)    Speech recognition software may have been used in this documentation; input is reviewed before signature to the best of my ability.     Joss Branch, Ph.D., [facility]-SLP  Speech-Language Pathologist-[facility]  [phone]  he/him/his

## 2025-07-01 NOTE — PATIENT INSTRUCTIONS
"Please attach referral from [facility] to this visit    Patient needs to be scheduled for:  Return Voice SLP Virtual or In Person or a mix    With:   Joss Branch PhD CCC-SLP    Number and Frequency of sessions:  6 appointments with approximately 14 days between each      NOTE, UNLESS SPECIFICALLY STATED IN SPECIAL INSTRUCTIONS ABOVE  Virtual appointments may be scheduled during in person times (but not the reverse)  When availability is limited the first appointment may be scheduled even if subsequent appointments aren't available in the prescribed timeframe (e.g. 14 days between appointments)  There should be at least 7 days between appointments  Once a patient has begun therapy they should only see that specific SLP (this does not include initial or follow-up evaluation)  Patient may schedule as many or as few of the sessions listed above as they desire      Home Practice Instructions:    For When you have the Noisy Breathing and difficulty inhaling:  Inhale through straw-shaped lips and the tip of your tongue touching your bottom teeth in the front  Make it loud at your lips. If it's loud there, it's hard to close your throat  Then Exhale on a long \"shhhhhhhhhhhhh\"  Try not to hold your breath after the inhale  Focus on filling your belly with air as you breathe in  It can also help to focus on the sound of the air passing your lips on the way in, and the sound of the \"shhh\" on the way out.  Do these exercises until your breathing feels comfortable  If you get \"stuck,\" exhale very forcefully on \"shhhhhhhhh\"      "

## 2025-07-01 NOTE — LETTER
2025       RE: Ralph Hagen  [address] [facility] [address]     Dear Colleague,    Thank you for referring your patient, Ralph Hagen, to the [facility] [place] at [facility]. Please see a copy of my visit note below.        [facility]   at the [facility]   Otolaryngology Clinic     Patient: Ralph Hagen    MRN: [id number]    : 1965    Age/Gender: 60 year old male  Date of Service: 2025  Rendering Provider:   Leslie Fitzgerald MD       Impression & Plan     IMPRESSION: Mr. 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  - PFTs last done  suggesting obstruction with mildly reduced diffusing capacity  - reflux not currently managed with medications  - BMI is 34  - scope shows bilateral vocal fold mild hypomobility, fungal infection over postcricoid area, postcricoid edema, paradoxical vocal fold motion, trachea with long segment collapse and mild thick mucous  - discussed etiology breathing issues is due to prior intubation with resulting bilateral vocal fold paresis and significant tracheal collapse with persistent tracheitis, also 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  - Discussed etiology of his voice trouble is due to fungal infection and laryngopharyngeal reflux  (he has diabetes and is on steroid inhalers)  - Discussed treatment of fungal infection with nystatin and fluconazole  - Discussed treatment of reflux with omeprazole and reflux Gourmet after meals and at bedtime and GI referral for long-term management of this given the severity of his reflux  - Discussed treatment of his inducible laryngeal obstruction with breathing therapy along with reflux control  - Discussed continuing working with pulmonology and ENT team at [place]  - Will see him back in a few weeks to confirm the fungal infection is resolved  - Agree with [place] referral for sleep evaluation, will enter this  Plan  - fluconazole and nystatin   - breathing therapy   - reflux gourmet after meals and at bedtime  - omeprazole 40 mg BID   - GI referral for reflux   - Sleep evaluation    RETURN VISIT: August 2025     Referring Provider   PCP: Cruz James  Referring Physician: Consuelo Baez  [address] [place] [id number]  Reason for Consultation   Dyspnea  A frame deformity  History of tracheitis  H/o trach, decannulated   History   HISTORY OF PRESENT ILLNESS: Mr. Hagen is a 60 year old male who presents to us today with long term covid, sever trachs, breathing concerns.      Of note patient is also following with Dr. Baez.    he presents today for evaluation. he reports:  - that he has stayed overall pretty stable since last appointment with Dr. Baez in November. Notes that breathing is okay at this time, but admits to noisy breathing with exercise. States that this has been ongoing for a while.   - has known symptoms of acid reflux but states that he is not currently taking any medications for this.     Dysphonia:  reports    Dysphagia:  reports    Dyspnea:  reports    GERD/LPRD:  reports    PAST MEDICAL HISTORY:   Past Medical History:   Diagnosis Date     Diabetes (H) 2007 or 2008     RAMÍREZ (obstructive sleep apnea) 2008    AHI 22.3 cpap       PAST SURGICAL HISTORY:   Past Surgical History:    Procedure Laterality Date     TONSILLECTOMY  1998       CURRENT MEDICATIONS:   Current Outpatient Medications:      ACE INHIBITOR CONTRAINDICATED AND/OR NOT INDICATED, , Disp: 0 each, Rfl: 0     aspirin 81 MG tablet, Take 81 mg by mouth daily, Disp: , Rfl:      blood glucose (ACCU-CHEK SMARTVIEW) test strip, Use to test blood sugar four to six times daily or as directed., Disp: 100 strip, Rfl: prn     blood glucose monitoring (ACCU-CHEK FASTCLIX) lancets, Use to test blood sugar four to six times daily or as directed., Disp: 1 Box, Rfl: prn     Cholecalciferol (VITAMIN D) 2000 UNITS tablet, Take 2,000 Units by mouth daily Taking 2 a day, Disp: 100 tablet, Rfl: 3     ELIQUIS ANTICOAGULANT 5 MG tablet, Take 5 mg by mouth 2 times daily., Disp: , Rfl:      gabapentin (NEURONTIN) 300 MG capsule, Take 300 mg by mouth 3 times daily, Disp: , Rfl:      hydrOXYzine HCl (ATARAX) 25 MG tablet, Take 25 mg by mouth daily as needed for anxiety., Disp: , Rfl:      insulin glargine (LANTUS VIAL) 100 UNIT/ML vial, Inject 45 Units Subcutaneous At Bedtime, Disp: , Rfl:      insulin lispro (HUMALOG KWIKPEN) 100 UNIT/ML injection, INJECT 4 UNITS PER CARB UNDER THE SKIN THREE TIMES A DAY BEFORE MEALS AS DIRECTED --MAX OF 45 UNITS/DAY, Disp: 15 mL, Rfl: 3     insulin pen needle (BD FARHAD U/F) 32G X 4 MM, Use 4 needles daily or as directed., Disp: 100 each, Rfl: prn     LORazepam (ATIVAN) 1 MG tablet, Take 1 mg by mouth., Disp: , Rfl:      STATIN NOT PRESCRIBED (INTENTIONAL), Please choose reason not prescribed, below, Disp: , Rfl:      TRELEGY ELLIPTA 100-62.5-25 MCG/ACT oral inhaler, Inhale 1 puff into the lungs daily., Disp: , Rfl:      predniSONE (DELTASONE) 20 MG tablet, Take two tablets (= 40mg) each day for 5 (five) days, Disp: 10 tablet, Rfl: 0    ALLERGIES: Shellfish-derived products, Aspirin, Blood-group specific substance, Insulin detemir, and Other food allergy    SOCIAL HISTORY:    Social History     Socioeconomic History      Marital status:      Spouse name: Not on file     Number of children: Not on file     Years of education: Not on file     Highest education level: Not on file   Occupational History     Not on file   Tobacco Use     Smoking status: Former     Current packs/day: 0.00     Average packs/day: 1 pack/day for 20.0 years (20.0 ttl pk-yrs)     Types: Cigarettes     Start date: 3/1/1993     Quit date: 3/1/2013     Years since quittin.3     Smokeless tobacco: Never     Tobacco comments:     current E Cig    Substance and Sexual Activity     Alcohol use: Yes     Alcohol/week: 0.0 standard drinks of alcohol     Comment: very occ     Drug use: No     Sexual activity: Yes     Partners: Female   Other Topics Concern     Parent/sibling w/ CABG, MI or angioplasty before 65F 55M? No      Service Not Asked     Blood Transfusions Not Asked     Caffeine Concern No     Occupational Exposure Not Asked     Hobby Hazards Not Asked     Sleep Concern Yes     Stress Concern Not Asked     Weight Concern Not Asked     Special Diet Not Asked     Back Care Not Asked     Exercise Not Asked     Bike Helmet Not Asked     Seat Belt Not Asked     Self-Exams Not Asked   Social History Narrative     Not on file     Social Drivers of Health     Financial Resource Strain: Not on File (2023)    Received from [facility]    Financial Resource Strain      Financial Resource Strain: 0   Food Insecurity: Not on File (2024)    Received from [employer]    Food Insecurity      Food: 0   Transportation Needs: No Transportation Needs (2024)    Received from [facility]    PRAPARE - Transportation      Lack of Transportation (Medical): No      Lack of Transportation (Non-Medical): No   Physical Activity: Insufficiently Active (2024)    Received from [facility]    Exercise Vital Sign      Days of Exercise per Week: 2 days      Minutes of Exercise per Session: 10 min   Stress: Not on File (2023)    Received from [facility]    Stress       Stress: 0   Social Connections: Not on File (9/19/2024)    Received from [facility]    Social [employer]      Connectedness: 0   Interpersonal Safety: Not At Risk (4/22/2024)    Received from [facility]    Humiliation, Afraid, Rape, and Kick questionnaire      Fear of Current or Ex-Partner: No      Emotionally Abused: No      Physically Abused: No      Sexually Abused: No   Housing Stability: Low Risk  (5/16/2024)    Received from [facility]    Housing Stability      What is your living situation today?: I have a steady place to live         FAMILY HISTORY:   Family History   Problem Relation Age of Onset     Cerebrovascular Disease No family hx of      Hypertension Mother      Hypertension Father      Diabetes No family hx of      C.A.D. No family hx of      Non-contributory for problems with anesthesia    REVIEW OF SYSTEMS:   The patient was asked a 14 point review of systems regarding constitutional symptoms, eye symptoms, ears, nose, mouth, throat symptoms, cardiovascular symptoms, respiratory symptoms, gastrointestinal symptoms, genitourinary symptoms, musculoskeletal symptoms, integumentary symptoms, neurological symptoms, psychiatric symptoms, endocrine symptoms, hematologic/lymphatic symptoms, and allergic/ immunologic symptoms.   The pertinent factors have been included in the HPI and below.  Patient Supplied Answers to Review of Systems       No data to display                Physical Examination   The patient underwent a physical examination as described below. The pertinent positive and negative findings are summarized after the description of the examination.  Constitutional: The patient's developmental and nutritional status was assessed. The patient's voice quality was assessed.  Head and Face: The head and face were inspected for deformities. The sinuses were palpated. The salivary glands were palpated. Facial muscle strength was assessed bilaterally.  Eyes: Extraocular movements and primary gaze  alignment were assessed.  Ears, Nose, Mouth and Throat: The ears and nose were examined for deformities. The nasal septum, mucosa, and turbinates were inspected by anterior rhinoscopy. The lips, teeth, and gums were examined for abnormalities. The oral mucosa, tongue, palate, tonsils, lateral and posterior pharynx were inspected for the presence of asymmetry or mucosal lesions.    Neck: The tracheal position was noted, and the neck mass palpated to determine if there were any asymmetries, abnormal neck masses, thyromegally, or thyroid nodules.  Respiratory: The nature of the breathing and chest expansion/symmetry was observed.  Cardiovascular: The patient was examined to determine the presence of any edema or jugular venous distension.  Abdomen: The contour of the abdomen was noted.  Lymphatic: The patient was examined for infraclavicular lymphadenopathy.  Musculoskeletal: The patient was inspected for the presence of skeletal deformities.  Extremities: The extremities were examined for any clubbing or cyanosis.  Skin: The skin was examined for inflammatory or neoplastic conditions.  Neurologic: The patient's orientation, mood, and affect were noted. The cranial nerve  functions were examined.  Other pertinent positive and negative findings on physical examination:      OC/OP: no lesions, uvula midline, soft palate elevates symmetrically   Neck: no lesions, no TH tenderness to palpation  Healed trach scar  All other physical examination findings were within normal limits and noncontributory.  Procedures   Video Laryngoscopy with Stroboscopy (CPT 18353)    Preoperative Diagnosis:  Dysphonia and throat symptoms  Postoperative Diagnosis: Dysphonia and throat symptoms  Indication:  Patient has new or persistent dysphonia and throat symptoms.  This requires evaluation by stroboscopy to fully delineate the laryngeal functioning; especially mucosal wave assessment, ultrasharp visualization of lesions on the vocal folds,  and overall functioning of the larynx.  Details of Procedure: A 70 degree rigid telescopic laryngoscope or a distal chip flexible scope was used. The lighting was obtained via a light cable connected to a stroboscopic unit. The telescope was inserted either transorally or transnasally until the vocal folds could be visualized. The patient was instructed to sustain the vowel  ee  at a comfortable pitch and loudness as the voice was monitored by a microphone connected to a fundamental frequency tracker. This circuit tracked vocal periodicity, allowing the light to flash in synchrony with the glottal cycles. A setting on the stroboscope was set to change the phase of light strobing with relation to the vocal fundamental frequency, producing an image of 1 to 2 glottal cycles every second. The video images were recorded for analysis. Use of the variable speed, slow and stop scan allowed careful study of pertinent segments of laryngeal function to increase accuracy of clinical assessments of function and dysfunction.  In particular, features of glottal closure, mucosal wave on the vocal fold cover and laryngeal symmetry were analyzed. Lastly, the patient was asked to phonate speech samples and auditory/perceptual evaluation of voice and resonance were performed.  The vocal quality was carefully evaluated for hoarseness, breathiness, loudness, phrase length and intelligibility to determine the source of dysphonia.    Findings:      B. LARYNGOVIDEOSTROBOSCOPY   Anatomic/Physiological Deviations:  Bilateral vocal fold mild hypomobility, fungal infection over postcricoid, postcricoid edema, paradoxical vocal fold motion, trachea with long segment collapse and mild thick mucous  Mucosal wave: Right:  mild restriction     Left: mild restriction  Bilateral Vocal Fold Vibration: Asymmetric  Vocal Process: Right: mild restriction    Left:  mild restriction  Vocal Fold closure: Complete glottal closure    Complication(s):  "None  Disposition: Patient tolerated the procedure well                    Review of Relevant Clinical Data   I personally reviewed:  Notes:     09/01/2022 Val Rausch CCC/SLP  Patient/Client Status: Patient reports: \"my breathing has gotten worse.\" Started the night his trach was taken out. Reports he is not sleeping well. Wakes up with difficulty breathing. When laying down it feels like there is an obstruction above where his trach was. This sensation improves when he sits up and uses saline nebs. Uses 4-5 saline nebs a day. Thinks it could be mucous or could be a blockage.   Patient reports he wheezes when he breathes in and out but worse when breathing in. He has been conscious of not talking too many words on 1 breath.  Reports he saw ENT yesterday. ENT reported narrowing in trachea below the vocal folds. Patient reports not feeling the scope in his airway. ENT ordered Modified Barium Swallow study due to concern for aspiration.   Patient reports he eats well. No coughing with eating drinking. No difficulty with food getting stuck.   Patient reports he continues with IMT home exercise program. He has had to decrease the resistance.     Compliance with home program: good    Pain: No pain. Pain is not associated with this episode or with this problem.     01/17/2023 ED  HPI: Patient presents with 2 concerns. First, patient is concerned about an increased sensation of dyspnea. Patient had COVID with critical care hospitalization and resulting sequelae from this early in the pandemic. Patient has had 2 trachs, not presently trach dependent. Patient has some deformity to his trachea as a result of this, and this causes him to have somewhat stridorous breathing especially if he is anxious. 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 He requires treatment with antibiotics every few weeks for his breathing, and then improves. Patient is also " concerned about increased pain and swelling in his right leg. Patient did have a blood clot in his leg during his COVID illness. Anticoagulation for this has since been discontinued. He does have some chronic venous stasis in the right leg as a result of this. However things are worse than usual in the right leg over the past few days, he is concerned the clot has returned. Social concerns include patient lives with his wife, who assists his care. Patient has doctored a fair amount at [facility] for this, and is supposed to have a surgery to fix the deformity of his trachea. He is also considering being seen at [facility] for assessment for this. He states that Ativan typically helps his breathing. Patient states there is also increasing pain in his right leg.     Impression and Plan: You felt better with Ativan, this helped your breathing. Your chest x-ray and CT scan did not show an obvious pneumonia or heart failure with fluid in the lungs. We will treat you with doxycycline, which is what you have been treated with before for your lungs. You have a new blood clot in your right thigh, we will treat this with Eliquis. I sent one months worth to the pharmacy.     Return instructions: If your symptoms persist or worsen, feel free to return to the ER at any time.     op note 4/28/23  Op Note - Consuelo Baez M.D. - 04/28/2023 10:55 AM CDT  Formatting of this note might be different from the original.  Pre-op Diagnosis  Colonization Methicillin Susceptible Staphylococcus Aureus,Stenosis Tracheal    Post-op Diagnosis  Colonization Methicillin Susceptible Staphylococcus Aureus,Stenosis Tracheal    A first assistant actively participated and was necessary for one or more of the following: opening, exposure and visualization during the case, maintaining hemostasis, wound closure resulting in its safe and expeditious completion.    Findings  Small A frame deformity noted near the second and third tracheal ring with the second  ring prolapsed into airway, excised during surgery.  Considerable scar around the prior tracheostomy sites  Tracheal inflammation noted through the trachea with areas of scarring in the distal trachea near alistair. Tortuous airway noted.  4-0 cuffed shiley placed.    Complications  None    Operative Note Narrative  Patient was brought to the operating room and placed in the supine position. A surgical timeout occurred, confirming the patient and procedures to be performed. Using a Propofol infusion, patient was put into a sleep state in preparation for sleep state endoscopy. A flexible bronchoscope was inserted into the patient's right nose and advanced to the larynx. 4 cc's of 4% lidocaine were then applied to the larynx via the working channel of the scope topically. The scope was then advanced into the airway to observe the a-frame deformity, which demonstrated no significant collapse on breathing or collapse distal to this area. The scope was then removed and patient was prepped and draped in the usual fashion for tracheostomy. 9 cc's of 1% lidocaine with 1:100,000 epinephrine was injected into the anterior neck surrounding previous tracheostomy scar. Incision was made in the previous scar using a 15-blade, and the scar tissue was carefully incised layer by layer until the airway was encountered. A combination of blunt, sharp, and bipolar cautery dissection was then used to lateralize the remaining thyroid tissue from the lateral tracheal walls. At this time, a flexible bronchoscope was inserted into the ETT, which was slowly brought out toward the larynx so that the stenotic airway area could be observed. A 25 gauge needle was placed into the suspected stenotic portion of the airway in the anterior neck and visualized to be in the same stenotic area of concern appreciated on the flexible scope within the airway. The ETT was then reinserted past the stenosis and ventilation resumed. A small window was then made  in the anterior trachea and thick scar tissue was removed with a combination of sharp dissection and ronguers. A 6.0 Cuffed Shiley was at first attempted, but was found to be too tight to fit into the patient's airway. A 4.0 cuffed Shiley was then placed into the airway without difficulty. The cuff was inflated, and end-tidal CO2 was confirmed and the ETT removed. The flexible scope was then reinserted into the nose and passed into the airway to observe the positioning of the tracheostomy tube and to confirm that the tracheal window removed consisted of the stenotic portion of the airway previously appreciated. The flexible scope was then placed into the tracheostomy tube lumen to ensure placement of the tracheostomy above the alistair, and positioned mildly abutting the side wall of the trachea, but otherwise in a good position. Tracheostomy tube was then sutured in place with 4 interrupted silk sutures and patient was turned over to anesthesia for emergence and transfer to PACU without complication.    Dale Hurtado M.D.    11/12/2024 Dr. Hilda Cruz  HISTORY OF PRESENT ILLNESS  The patient is a 59 year old man with a past medical history of severe COVID-19 infection in 2020 resulting in COVID-19 pneumonitis, prolonged intubation on ECMO, and tracheal stenosis.    The patient reports that he has been doing well since his hospitalization in April. He was admitted in April after acute dyspnea when vomiting. There was not a lung infection or true respiratory exacerbation so he was able to be dismissed home. He continues to have shortness of breath, especially when bending over. He is having some coughing but notes that increased diuresis helps to improve his breathing. He has been adjusting his furosemide as needed for dyspnea, cough, and lower extremity swelling. He had an echo in February 2024 with normal right and left ventricular function. He feels that Trelegy has improved his breathing. He is having increased  hoarseness, and he will be seeing Speech Pathology locally. He denies any lung infections.     The patient's chest x-ray is stable to slightly improved. Spirometry is normal. The diffusing capacity is slightly reduced from previous. Given that his chest imaging is stable and the last echo is normal, we can monitor at this time. If his lower extremity swelling gets worse, I would recommend repeat Echo. This can be done locally.   He is having increased hoarseness since starting Trelegy but feels that his breathing is improved enough that he does not want to stop it. We will see how he does with Speech. If dysphonia remains an issue, we could tried inhaled steroid via HFA or nebulizer. We have had trouble with nebulizer coverage in the past.    We discussed vaccination for influenza, RSV, and COVID 19. He is not sure about the COVID 19 vaccination given prior side effects. I did let him know that there is a new COVID vaccine to consider.     11/12/2024 Dr. Consuelo Greene  HISTORY OF PRESENT ILLNESS:  Mr. Hagen is a 58 y.o. year old gentleman presenting for post-operative visit after tracheostomy, microscopic direct laryngoscopy, and sleep endoscopy on 4/28/23 for tracheal stenosis, RAMÍREZ, and acute tracheitis without obstruction. He is followed by Dr. Nancy Douglas in Pulmonary Medicine.     I last saw the patient me on 11/16/2023. At that time, he was fully healed from surgery. We discussed he could benefit from continuing to work with our Pulmonary colleagues to help control his frequent issues with tracheitis. I also strongly felt he would benefit from a sleep study, and I ordered an external referral for him to have this done closer to home. We planned at that visit for him to return to see me in 1 year, and he is here today for that visit.    Today the patient reports some changes in his voice and his breathing. He had tried CBD gummies for a few days and had improved sleep, but he was unsure if this was okay  to continue. He has not yet gotten a sleep study and has not been using his CPAP.     Patient rates the voice today as 50 percent of normal.  Concern equals 5/7. Effort equals 6/10. Patient can talk for <5 minutes before having difficulty with voice. Total score on the voice handicap index -10 (VHI-10) is 22/40.   ___________________  Radiology:     04/15/2022 CT Neck  Impression  1. Stable mild to moderate long segment tracheal stenosis since 9/17/2021, consistent with history of sequelae of prior intubation/tracheostomy.   2. New right apical tree-in-bud opacities, suggestive of recent pneumonia. Slightly increased right paratracheal lymph nodes, presumably reactive.   3. Stable right apical nodular consolidation versus loculated pleural effusion within the right major fissure.     04/17/2022 XR Video swallow  FINDINGS/IMPRESSION: Multiple consistencies ranging from thin to solid were trialed. Appropriate bolus formation. Appropriate soft palate elevation. No nasal regurgitation. No premature spillage.  Normal pharyngeal contraction. Appropriate epiglottic inversion. No penetration or aspiration. No evidence for a cricopharyngeal bar.     05/08/2022 CT Chest  IMPRESSION: Minimal interstitial and ground-glass changes in the lungs. Tracheostomy tube in good position. No endobronchial lesion.     10/06/2022 CT Chest  Impression  1.  Diffuse lung disease as described with components of small airways disease and fibrosis. This is   likely postinfectious given temporal evolution since the earliest available exam of 02/03/2021.   2.  Mild tracheal narrowing at the thoracic inlet.     10/7/2022 FL Esophagram + Video swallow  Impression  1. Flash laryngeal penetration with thin liquid barium and consecutive swallows. Please see speech   pathologist report for further details and recommendations.   2. Normal esophagram.     01/17/2023 XR Chest  Impression  1. Small lung volumes are present with perihilar and bibasilar  "atelectasis noted. Mild architectural distortion is seen in the right hilum.   ___________________  Labs:  Lab Results   Component Value Date    TSH 0.45 07/15/2019     Lab Results   Component Value Date     07/15/2019    CO2 25 07/15/2019    BUN 11 07/15/2019     Lab Results   Component Value Date    WBC 9.1 09/20/2010    HGB 17.4 09/20/2010    HCT 49.5 09/20/2010    MCV 88 09/20/2010     09/20/2010     No results found for: \"PT\", \"PTT\", \"INR\"  No results found for: \"ANEESH\"  No components found for: \"RHEUMATOIDFACTOR\", \"RF\"  Lab Results   Component Value Date    CRP 6.3 09/20/2010     No components found for: \"CKTOT\", \"URICACID\"  No components found for: \"C3\", \"C4\", \"DSDNAAB\", \"NDNAABIFA\"  No results found for: \"MPOAB\"    Patient reported Quality of Life (QOL) Measures   Patient Supplied Answers To VHI Questionnaire       No data to display                  Patient Supplied Answers To EAT Questionnaire       No data to display                  Patient Supplied Answers To CSI Questionnaire       No data to display                  Patient Supplied Answers to Dyspnea Index Questionnaire:       No data to display                    Thank you for the kind referral and for allowing me to share in the care of Mr. Hagen. If you have any questions, please do not hesitate to contact me.    Scribe Disclosure:   I, Lauryn Brantley, am serving as a scribe; to document services personally performed by Leslie Fitzgerald MD -based on data collection and the provider's statements to me.     Provider Disclosure:  I agree with above History, Review of Systems, Physical exam and Plan.  I have reviewed the content of the documentation and have edited it as needed. I have personally performed the services documented here and the documentation accurately represents those services and the decisions I have made.       Electronically signed by:  Leslie Fitzgerald MD    Laryngology    [facility]  " Department of  Otolaryngology - Head and Neck Surgery  [facility]  [address] [place] [id number]  Appointment line: [phone]  Fax: [phone]  [email or web address]    Again, thank you for allowing me to participate in the care of your patient.      Sincerely,    Leslie Fitzgerald MD

## 2025-07-01 NOTE — PROGRESS NOTES
[facility]   at the [facility]   Otolaryngology Clinic     Patient: Ralph Hagen    MRN: [id number]    : 1965    Age/Gender: 60 year old male  Date of Service: 2025  Rendering Provider:   Leslie Fitzgerald MD       Impression & Plan     IMPRESSION: Mr. 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  - PFTs last done  suggesting obstruction with mildly reduced diffusing capacity  - reflux not currently managed with medications  - BMI is 34  - scope shows bilateral vocal fold mild hypomobility, fungal infection over postcricoid area, postcricoid edema, paradoxical vocal fold motion, trachea with long segment collapse and mild thick mucous  - discussed etiology breathing issues is due to prior intubation with resulting bilateral vocal fold paresis and significant tracheal collapse with persistent tracheitis, also 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  - Discussed etiology of his voice trouble is due to fungal infection and laryngopharyngeal reflux (he has diabetes and is on steroid inhalers)  - Discussed treatment of fungal infection with nystatin and fluconazole  - Discussed treatment of reflux with omeprazole and reflux Gourmet after meals and at bedtime and GI referral for long-term management of this given the severity of his reflux  - Discussed treatment  of his inducible laryngeal obstruction with breathing therapy along with reflux control  - Discussed continuing working with pulmonology and ENT team at [facility]  - Will see him back in a few weeks to confirm the fungal infection is resolved  - Agree with [facility] referral for sleep evaluation, will enter this  Plan  - fluconazole and nystatin   - breathing therapy   - reflux gourmet after meals and at bedtime  - omeprazole 40 mg BID   - GI referral for reflux   - Sleep evaluation    RETURN VISIT: August 2025     Referring Provider   PCP: Cruz James  Referring Physician: Consuelo Baez  [address] [place] [id number]  Reason for Consultation   Dyspnea  A frame deformity  History of tracheitis  H/o trach, decannulated   History   HISTORY OF PRESENT ILLNESS: Mr. Hagen is a 60 year old male who presents to us today with long term covid, sever trachs, breathing concerns.      Of note patient is also following with Dr. Baez.    he presents today for evaluation. he reports:  - that he has stayed overall pretty stable since last appointment with Dr. Baez in November. Notes that breathing is okay at this time, but admits to noisy breathing with exercise. States that this has been ongoing for a while.   - has known symptoms of acid reflux but states that he is not currently taking any medications for this.     Dysphonia:  reports    Dysphagia:  reports    Dyspnea:  reports    GERD/LPRD:  reports    PAST MEDICAL HISTORY:   Past Medical History:   Diagnosis Date    Diabetes (H) 2007 or 2008    RAMÍREZ (obstructive sleep apnea) 2008    AHI 22.3 cpap       PAST SURGICAL HISTORY:   Past Surgical History:   Procedure Laterality Date    TONSILLECTOMY  1998       CURRENT MEDICATIONS:   Current Outpatient Medications:     ACE INHIBITOR CONTRAINDICATED AND/OR NOT INDICATED, , Disp: 0 each, Rfl: 0    aspirin 81 MG tablet, Take 81 mg by mouth daily, Disp: , Rfl:     blood glucose (ACCU-CHEK SMARTVIEW) test strip, Use to test  blood sugar four to six times daily or as directed., Disp: 100 strip, Rfl: prn    blood glucose monitoring (ACCU-CHEK FASTCLIX) lancets, Use to test blood sugar four to six times daily or as directed., Disp: 1 Box, Rfl: prn    Cholecalciferol (VITAMIN D) 2000 UNITS tablet, Take 2,000 Units by mouth daily Taking 2 a day, Disp: 100 tablet, Rfl: 3    ELIQUIS ANTICOAGULANT 5 MG tablet, Take 5 mg by mouth 2 times daily., Disp: , Rfl:     gabapentin (NEURONTIN) 300 MG capsule, Take 300 mg by mouth 3 times daily, Disp: , Rfl:     hydrOXYzine HCl (ATARAX) 25 MG tablet, Take 25 mg by mouth daily as needed for anxiety., Disp: , Rfl:     insulin glargine (LANTUS VIAL) 100 UNIT/ML vial, Inject 45 Units Subcutaneous At Bedtime, Disp: , Rfl:     insulin lispro (HUMALOG KWIKPEN) 100 UNIT/ML injection, INJECT 4 UNITS PER CARB UNDER THE SKIN THREE TIMES A DAY BEFORE MEALS AS DIRECTED --MAX OF 45 UNITS/DAY, Disp: 15 mL, Rfl: 3    insulin pen needle (BD FARHAD U/F) 32G X 4 MM, Use 4 needles daily or as directed., Disp: 100 each, Rfl: prn    LORazepam (ATIVAN) 1 MG tablet, Take 1 mg by mouth., Disp: , Rfl:     STATIN NOT PRESCRIBED (INTENTIONAL), Please choose reason not prescribed, below, Disp: , Rfl:     TRELEGY ELLIPTA 100-62.5-25 MCG/ACT oral inhaler, Inhale 1 puff into the lungs daily., Disp: , Rfl:     predniSONE (DELTASONE) 20 MG tablet, Take two tablets (= 40mg) each day for 5 (five) days, Disp: 10 tablet, Rfl: 0    ALLERGIES: Shellfish-derived products, Aspirin, Blood-group specific substance, Insulin detemir, and Other food allergy    SOCIAL HISTORY:    Social History     Socioeconomic History    Marital status:      Spouse name: Not on file    Number of children: Not on file    Years of education: Not on file    Highest education level: Not on file   Occupational History    Not on file   Tobacco Use    Smoking status: Former     Current packs/day: 0.00     Average packs/day: 1 pack/day for 20.0 years (20.0 ttl pk-yrs)      Types: Cigarettes     Start date: 3/1/1993     Quit date: 3/1/2013     Years since quittin.3    Smokeless tobacco: Never    Tobacco comments:     current E Cig    Substance and Sexual Activity    Alcohol use: Yes     Alcohol/week: 0.0 standard drinks of alcohol     Comment: very occ    Drug use: No    Sexual activity: Yes     Partners: Female   Other Topics Concern    Parent/sibling w/ CABG, MI or angioplasty before 65F 55M? No     Service Not Asked    Blood Transfusions Not Asked    Caffeine Concern No    Occupational Exposure Not Asked    Hobby Hazards Not Asked    Sleep Concern Yes    Stress Concern Not Asked    Weight Concern Not Asked    Special Diet Not Asked    Back Care Not Asked    Exercise Not Asked    Bike Helmet Not Asked    Seat Belt Not Asked    Self-Exams Not Asked   Social History Narrative    Not on file     Social Drivers of Health     Financial Resource Strain: Not on File (2023)    Received from [facility]    Financial Resource Strain     Financial Resource Strain: 0   Food Insecurity: Not on File (2024)    Received from [facility]    Food Insecurity     Food: 0   Transportation Needs: No Transportation Needs (2024)    Received from [facility]    PRAPARE - Transportation     Lack of Transportation (Medical): No     Lack of Transportation (Non-Medical): No   Physical Activity: Insufficiently Active (2024)    Received from [facility]    Exercise Vital Sign     Days of Exercise per Week: 2 days     Minutes of Exercise per Session: 10 min   Stress: Not on File (2023)    Received from [facility]    Stress     Stress: 0   Social Connections: Not on File (2024)    Received from [facility]    Social Connections     Connectedness: 0   Interpersonal Safety: Not At Risk (2024)    Received from [facility]    Humiliation, Afraid, Rape, and Kick questionnaire     Fear of Current or Ex-Partner: No     Emotionally Abused: No     Physically Abused: No     Sexually Abused: No    Housing Stability: Low Risk  (5/16/2024)    Received from [facility]    Housing Stability     What is your living situation today?: I have a steady place to live         FAMILY HISTORY:   Family History   Problem Relation Age of Onset    Cerebrovascular Disease No family hx of     Hypertension Mother     Hypertension Father     Diabetes No family hx of     C.A.D. No family hx of      Non-contributory for problems with anesthesia    REVIEW OF SYSTEMS:   The patient was asked a 14 point review of systems regarding constitutional symptoms, eye symptoms, ears, nose, mouth, throat symptoms, cardiovascular symptoms, respiratory symptoms, gastrointestinal symptoms, genitourinary symptoms, musculoskeletal symptoms, integumentary symptoms, neurological symptoms, psychiatric symptoms, endocrine symptoms, hematologic/lymphatic symptoms, and allergic/ immunologic symptoms.   The pertinent factors have been included in the HPI and below.  Patient Supplied Answers to Review of Systems       No data to display                Physical Examination   The patient underwent a physical examination as described below. The pertinent positive and negative findings are summarized after the description of the examination.  Constitutional: The patient's developmental and nutritional status was assessed. The patient's voice quality was assessed.  Head and Face: The head and face were inspected for deformities. The sinuses were palpated. The salivary glands were palpated. Facial muscle strength was assessed bilaterally.  Eyes: Extraocular movements and primary gaze alignment were assessed.  Ears, Nose, Mouth and Throat: The ears and nose were examined for deformities. The nasal septum, mucosa, and turbinates were inspected by anterior rhinoscopy. The lips, teeth, and gums were examined for abnormalities. The oral mucosa, tongue, palate, tonsils, lateral and posterior pharynx were inspected for the presence of asymmetry or mucosal lesions.     Neck: The tracheal position was noted, and the neck mass palpated to determine if there were any asymmetries, abnormal neck masses, thyromegally, or thyroid nodules.  Respiratory: The nature of the breathing and chest expansion/symmetry was observed.  Cardiovascular: The patient was examined to determine the presence of any edema or jugular venous distension.  Abdomen: The contour of the abdomen was noted.  Lymphatic: The patient was examined for infraclavicular lymphadenopathy.  Musculoskeletal: The patient was inspected for the presence of skeletal deformities.  Extremities: The extremities were examined for any clubbing or cyanosis.  Skin: The skin was examined for inflammatory or neoplastic conditions.  Neurologic: The patient's orientation, mood, and affect were noted. The cranial nerve  functions were examined.  Other pertinent positive and negative findings on physical examination:      OC/OP: no lesions, uvula midline, soft palate elevates symmetrically   Neck: no lesions, no TH tenderness to palpation  Healed trach scar  All other physical examination findings were within normal limits and noncontributory.  Procedures   Video Laryngoscopy with Stroboscopy (CPT 04476)    Preoperative Diagnosis:  Dysphonia and throat symptoms  Postoperative Diagnosis: Dysphonia and throat symptoms  Indication:  Patient has new or persistent dysphonia and throat symptoms.  This requires evaluation by stroboscopy to fully delineate the laryngeal functioning; especially mucosal wave assessment, ultrasharp visualization of lesions on the vocal folds, and overall functioning of the larynx.  Details of Procedure: A 70 degree rigid telescopic laryngoscope or a distal chip flexible scope was used. The lighting was obtained via a light cable connected to a stroboscopic unit. The telescope was inserted either transorally or transnasally until the vocal folds could be visualized. The patient was instructed to sustain the vowel  ee  at  "a comfortable pitch and loudness as the voice was monitored by a microphone connected to a fundamental frequency tracker. This circuit tracked vocal periodicity, allowing the light to flash in synchrony with the glottal cycles. A setting on the stroboscope was set to change the phase of light strobing with relation to the vocal fundamental frequency, producing an image of 1 to 2 glottal cycles every second. The video images were recorded for analysis. Use of the variable speed, slow and stop scan allowed careful study of pertinent segments of laryngeal function to increase accuracy of clinical assessments of function and dysfunction.  In particular, features of glottal closure, mucosal wave on the vocal fold cover and laryngeal symmetry were analyzed. Lastly, the patient was asked to phonate speech samples and auditory/perceptual evaluation of voice and resonance were performed.  The vocal quality was carefully evaluated for hoarseness, breathiness, loudness, phrase length and intelligibility to determine the source of dysphonia.    Findings:      B. LARYNGOVIDEOSTROBOSCOPY   Anatomic/Physiological Deviations:  Bilateral vocal fold mild hypomobility, fungal infection over postcricoid, postcricoid edema, paradoxical vocal fold motion, trachea with long segment collapse and mild thick mucous  Mucosal wave: Right:  mild restriction     Left: mild restriction  Bilateral Vocal Fold Vibration: Asymmetric  Vocal Process: Right: mild restriction    Left:  mild restriction  Vocal Fold closure: Complete glottal closure    Complication(s): None  Disposition: Patient tolerated the procedure well                    Review of Relevant Clinical Data   I personally reviewed:  Notes:     09/01/2022 Val Rausch CCC/SLP  Patient/Client Status: Patient reports: \"my breathing has gotten worse.\" Started the night his trach was taken out. Reports he is not sleeping well. Wakes up with difficulty breathing. When laying down it feels like " there is an obstruction above where his trach was. This sensation improves when he sits up and uses saline nebs. Uses 4-5 saline nebs a day. Thinks it could be mucous or could be a blockage.   Patient reports he wheezes when he breathes in and out but worse when breathing in. He has been conscious of not talking too many words on 1 breath.  Reports he saw ENT yesterday. ENT reported narrowing in trachea below the vocal folds. Patient reports not feeling the scope in his airway. ENT ordered Modified Barium Swallow study due to concern for aspiration.   Patient reports he eats well. No coughing with eating drinking. No difficulty with food getting stuck.   Patient reports he continues with IMT home exercise program. He has had to decrease the resistance.     Compliance with home program: good    Pain: No pain. Pain is not associated with this episode or with this problem.     01/17/2023 ED  HPI: Patient presents with 2 concerns. First, patient is concerned about an increased sensation of dyspnea. Patient had COVID with critical care hospitalization and resulting sequelae from this early in the pandemic. Patient has had 2 trachs, not presently trach dependent. Patient has some deformity to his trachea as a result of this, and this causes him to have somewhat stridorous breathing especially if he is anxious. 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 He requires treatment with antibiotics every few weeks for his breathing, and then improves. Patient is also concerned about increased pain and swelling in his right leg. Patient did have a blood clot in his leg during his COVID illness. Anticoagulation for this has since been discontinued. He does have some chronic venous stasis in the right leg as a result of this. However things are worse than usual in the right leg over the past few days, he is concerned the clot has returned. Social concerns  include patient lives with his wife, who assists his care. Patient has doctored a fair amount at [facility] for this, and is supposed to have a surgery to fix the deformity of his trachea. He is also considering being seen at [facility] for assessment for this. He states that Ativan typically helps his breathing. Patient states there is also increasing pain in his right leg.     Impression and Plan: You felt better with Ativan, this helped your breathing. Your chest x-ray and CT scan did not show an obvious pneumonia or heart failure with fluid in the lungs. We will treat you with doxycycline, which is what you have been treated with before for your lungs. You have a new blood clot in your right thigh, we will treat this with Eliquis. I sent one months worth to the pharmacy.     Return instructions: If your symptoms persist or worsen, feel free to return to the ER at any time.     op note 4/28/23  Op Note - Consuelo Baez M.D. - 04/28/2023 10:55 AM CDT  Formatting of this note might be different from the original.  Pre-op Diagnosis  Colonization Methicillin Susceptible Staphylococcus Aureus,Stenosis Tracheal    Post-op Diagnosis  Colonization Methicillin Susceptible Staphylococcus Aureus,Stenosis Tracheal    A first assistant actively participated and was necessary for one or more of the following: opening, exposure and visualization during the case, maintaining hemostasis, wound closure resulting in its safe and expeditious completion.    Findings  Small A frame deformity noted near the second and third tracheal ring with the second ring prolapsed into airway, excised during surgery.  Considerable scar around the prior tracheostomy sites  Tracheal inflammation noted through the trachea with areas of scarring in the distal trachea near alistair. Tortuous airway noted.  4-0 cuffed shiley placed.    Complications  None    Operative Note Narrative  Patient was brought to the operating room and placed in the supine position. A  surgical timeout occurred, confirming the patient and procedures to be performed. Using a Propofol infusion, patient was put into a sleep state in preparation for sleep state endoscopy. A flexible bronchoscope was inserted into the patient's right nose and advanced to the larynx. 4 cc's of 4% lidocaine were then applied to the larynx via the working channel of the scope topically. The scope was then advanced into the airway to observe the a-frame deformity, which demonstrated no significant collapse on breathing or collapse distal to this area. The scope was then removed and patient was prepped and draped in the usual fashion for tracheostomy. 9 cc's of 1% lidocaine with 1:100,000 epinephrine was injected into the anterior neck surrounding previous tracheostomy scar. Incision was made in the previous scar using a 15-blade, and the scar tissue was carefully incised layer by layer until the airway was encountered. A combination of blunt, sharp, and bipolar cautery dissection was then used to lateralize the remaining thyroid tissue from the lateral tracheal walls. At this time, a flexible bronchoscope was inserted into the ETT, which was slowly brought out toward the larynx so that the stenotic airway area could be observed. A 25 gauge needle was placed into the suspected stenotic portion of the airway in the anterior neck and visualized to be in the same stenotic area of concern appreciated on the flexible scope within the airway. The ETT was then reinserted past the stenosis and ventilation resumed. A small window was then made in the anterior trachea and thick scar tissue was removed with a combination of sharp dissection and ronguers. A 6.0 Cuffed Shiley was at first attempted, but was found to be too tight to fit into the patient's airway. A 4.0 cuffed Shiley was then placed into the airway without difficulty. The cuff was inflated, and end-tidal CO2 was confirmed and the ETT removed. The flexible scope was then  reinserted into the nose and passed into the airway to observe the positioning of the tracheostomy tube and to confirm that the tracheal window removed consisted of the stenotic portion of the airway previously appreciated. The flexible scope was then placed into the tracheostomy tube lumen to ensure placement of the tracheostomy above the alistair, and positioned mildly abutting the side wall of the trachea, but otherwise in a good position. Tracheostomy tube was then sutured in place with 4 interrupted silk sutures and patient was turned over to anesthesia for emergence and transfer to PACU without complication.    Dale Hurtado M.D.    11/12/2024 Dr. Hilda Cruz  HISTORY OF PRESENT ILLNESS  The patient is a 59 year old man with a past medical history of severe COVID-19 infection in 2020 resulting in COVID-19 pneumonitis, prolonged intubation on ECMO, and tracheal stenosis.    The patient reports that he has been doing well since his hospitalization in April. He was admitted in April after acute dyspnea when vomiting. There was not a lung infection or true respiratory exacerbation so he was able to be dismissed home. He continues to have shortness of breath, especially when bending over. He is having some coughing but notes that increased diuresis helps to improve his breathing. He has been adjusting his furosemide as needed for dyspnea, cough, and lower extremity swelling. He had an echo in February 2024 with normal right and left ventricular function. He feels that Trelegy has improved his breathing. He is having increased hoarseness, and he will be seeing Speech Pathology locally. He denies any lung infections.     The patient's chest x-ray is stable to slightly improved. Spirometry is normal. The diffusing capacity is slightly reduced from previous. Given that his chest imaging is stable and the last echo is normal, we can monitor at this time. If his lower extremity swelling gets worse, I would recommend  repeat Echo. This can be done locally.   He is having increased hoarseness since starting Trelegy but feels that his breathing is improved enough that he does not want to stop it. We will see how he does with Speech. If dysphonia remains an issue, we could tried inhaled steroid via HFA or nebulizer. We have had trouble with nebulizer coverage in the past.    We discussed vaccination for influenza, RSV, and COVID 19. He is not sure about the COVID 19 vaccination given prior side effects. I did let him know that there is a new COVID vaccine to consider.     11/12/2024 Dr. Consuelo Greene  HISTORY OF PRESENT ILLNESS:  Mr. Hagen is a 58 y.o. year old gentleman presenting for post-operative visit after tracheostomy, microscopic direct laryngoscopy, and sleep endoscopy on 4/28/23 for tracheal stenosis, RAMÍREZ, and acute tracheitis without obstruction. He is followed by Dr. Nancy Douglas in Pulmonary Medicine.     I last saw the patient me on 11/16/2023. At that time, he was fully healed from surgery. We discussed he could benefit from continuing to work with our Pulmonary colleagues to help control his frequent issues with tracheitis. I also strongly felt he would benefit from a sleep study, and I ordered an external referral for him to have this done closer to home. We planned at that visit for him to return to see me in 1 year, and he is here today for that visit.    Today the patient reports some changes in his voice and his breathing. He had tried CBD gummies for a few days and had improved sleep, but he was unsure if this was okay to continue. He has not yet gotten a sleep study and has not been using his CPAP.     Patient rates the voice today as 50 percent of normal.  Concern equals 5/7. Effort equals 6/10. Patient can talk for <5 minutes before having difficulty with voice. Total score on the voice handicap index -10 (VHI-10) is 22/40.   ___________________  Radiology:     04/15/2022 CT Neck  Impression  1.  Stable mild to moderate long segment tracheal stenosis since 9/17/2021, consistent with history of sequelae of prior intubation/tracheostomy.   2. New right apical tree-in-bud opacities, suggestive of recent pneumonia. Slightly increased right paratracheal lymph nodes, presumably reactive.   3. Stable right apical nodular consolidation versus loculated pleural effusion within the right major fissure.     04/17/2022 XR Video swallow  FINDINGS/IMPRESSION: Multiple consistencies ranging from thin to solid were trialed. Appropriate bolus formation. Appropriate soft palate elevation. No nasal regurgitation. No premature spillage.  Normal pharyngeal contraction. Appropriate epiglottic inversion. No penetration or aspiration. No evidence for a cricopharyngeal bar.     05/08/2022 CT Chest  IMPRESSION: Minimal interstitial and ground-glass changes in the lungs. Tracheostomy tube in good position. No endobronchial lesion.     10/06/2022 CT Chest  Impression  1.  Diffuse lung disease as described with components of small airways disease and fibrosis. This is   likely postinfectious given temporal evolution since the earliest available exam of 02/03/2021.   2.  Mild tracheal narrowing at the thoracic inlet.     10/7/2022 FL Esophagram + Video swallow  Impression  1. Flash laryngeal penetration with thin liquid barium and consecutive swallows. Please see speech   pathologist report for further details and recommendations.   2. Normal esophagram.     01/17/2023 XR Chest  Impression  1. Small lung volumes are present with perihilar and bibasilar atelectasis noted. Mild architectural distortion is seen in the right hilum.   ___________________  Labs:  Lab Results   Component Value Date    TSH 0.45 07/15/2019     Lab Results   Component Value Date     07/15/2019    CO2 25 07/15/2019    BUN 11 07/15/2019     Lab Results   Component Value Date    WBC 9.1 09/20/2010    HGB 17.4 09/20/2010    HCT 49.5 09/20/2010    MCV 88  "09/20/2010     09/20/2010     No results found for: \"PT\", \"PTT\", \"INR\"  No results found for: \"ANEESH\"  No components found for: \"RHEUMATOIDFACTOR\", \"RF\"  Lab Results   Component Value Date    CRP 6.3 09/20/2010     No components found for: \"CKTOT\", \"URICACID\"  No components found for: \"C3\", \"C4\", \"DSDNAAB\", \"NDNAABIFA\"  No results found for: \"MPOAB\"    Patient reported Quality of Life (QOL) Measures   Patient Supplied Answers To VHI Questionnaire       No data to display                  Patient Supplied Answers To EAT Questionnaire       No data to display                  Patient Supplied Answers To CSI Questionnaire       No data to display                  Patient Supplied Answers to Dyspnea Index Questionnaire:       No data to display                    Thank you for the kind referral and for allowing me to share in the care of Mr. Hagen. If you have any questions, please do not hesitate to contact me.    Scribe Disclosure:   I, Lauryn Brantley, am serving as a scribe; to document services personally performed by Leslie Fitzgerald MD -based on data collection and the provider's statements to me.     Provider Disclosure:  I agree with above History, Review of Systems, Physical exam and Plan.  I have reviewed the content of the documentation and have edited it as needed. I have personally performed the services documented here and the documentation accurately represents those services and the decisions I have made.       Electronically signed by:  Leslie Fitzgerald MD    Laryngology    [facility]  Department of  Otolaryngology - Head and Neck Surgery  [facility]  [address] [place] [address]  Appointment line: [phone]  Fax: [phone]  [email or web address]    "

## 2025-07-01 NOTE — PATIENT INSTRUCTIONS
1.  You were seen in the ENT Clinic today by Dr. Fitzgerald. If you have any questions or concerns after your appointment, please call [phone]. Press option #1 for scheduling related needs. Press option #3 for Nurse advice.     2.  Dr. Fitzgerald has recommended the following:              - Start taking Fluconazole as directed. Prescription sent to your pharmacy              - Use Nystatin solution to swish and swallow as directed. Prescriptions sent to your pharmacy              - Take omeprazole 40 mg, twice a day. Prescription sent to your pharmacy              - breathing therapy              -GI referral placed to address symptoms of reflux. They will contact your for scheduling              - Sleep Medicine referral placed to address snoring. They will contact you for scheduling              - Start reflux gourmet  This will form an algae float to prevent regurgitation   Take this before bedtime and after meals.  Do not take after having carbonated beverage     This can be bought on the website of the company or on [employer]    [email or web address]              3.  Plan is to return to clinic in early August     How to Contact Us:  Send a [employer] message to your provider. Our team will respond to you via [employer]. Occasionally, we will need to call you to get further information.  For urgent matters (Monday-Friday, 8:00 AM-3:30 PM), call the ENT Clinic: [phone] and speak with a call center team member - they will route your call appropriately.           Nargis Garcia LPN  [phone]  [facility] - Otolaryngology

## 2025-07-02 ENCOUNTER — PATIENT OUTREACH (OUTPATIENT)
Dept: CARE COORDINATION | Facility: CLINIC | Age: 60
End: 2025-07-02
Payer: MEDICARE

## 2025-07-05 ENCOUNTER — PATIENT OUTREACH (OUTPATIENT)
Dept: CARE COORDINATION | Facility: CLINIC | Age: 60
End: 2025-07-05
Payer: MEDICARE

## 2025-07-29 ENCOUNTER — PRE VISIT (OUTPATIENT)
Dept: OTOLARYNGOLOGY | Facility: CLINIC | Age: 60
End: 2025-07-29

## 2025-08-05 ENCOUNTER — TELEPHONE (OUTPATIENT)
Dept: GASTROENTEROLOGY | Facility: CLINIC | Age: 60
End: 2025-08-05
Payer: MEDICARE

## 2025-08-21 ENCOUNTER — OFFICE VISIT (OUTPATIENT)
Dept: OTOLARYNGOLOGY | Facility: CLINIC | Age: 60
End: 2025-08-21
Payer: MEDICARE

## 2025-08-21 VITALS
WEIGHT: 218 LBS | HEART RATE: 69 BPM | SYSTOLIC BLOOD PRESSURE: 151 MMHG | DIASTOLIC BLOOD PRESSURE: 53 MMHG | HEIGHT: 67 IN | OXYGEN SATURATION: 95 % | BODY MASS INDEX: 34.21 KG/M2

## 2025-08-21 DIAGNOSIS — R49.0 DYSPHONIA: Primary | ICD-10-CM

## 2025-09-10 ENCOUNTER — PRE VISIT (OUTPATIENT)
Dept: GASTROENTEROLOGY | Facility: CLINIC | Age: 60
End: 2025-09-10